# Patient Record
Sex: FEMALE | Race: WHITE | Employment: PART TIME | URBAN - NONMETROPOLITAN AREA
[De-identification: names, ages, dates, MRNs, and addresses within clinical notes are randomized per-mention and may not be internally consistent; named-entity substitution may affect disease eponyms.]

---

## 2017-03-06 DIAGNOSIS — F41.9 ANXIETY: ICD-10-CM

## 2017-03-06 RX ORDER — CLONAZEPAM 0.5 MG/1
TABLET ORAL
Qty: 90 TABLET | Refills: 5 | Status: ON HOLD | OUTPATIENT
Start: 2017-03-06 | End: 2017-04-28

## 2017-03-06 NOTE — TELEPHONE ENCOUNTER
Klonopin      Last Written Prescription Date: 10/10/16  Last Fill Quantity: 90,  # refills: 5   Last Office Visit with G, P or [facility] prescribing provider: 6/15/15

## 2017-04-25 ENCOUNTER — HOSPITAL ENCOUNTER (INPATIENT)
Facility: HOSPITAL | Age: 52
LOS: 2 days | Discharge: HOME | DRG: 897 | End: 2017-04-28
Attending: FAMILY MEDICINE | Admitting: PSYCHIATRY & NEUROLOGY
Payer: MEDICAID

## 2017-04-25 DIAGNOSIS — R45.851 SUICIDAL IDEATION: Primary | ICD-10-CM

## 2017-04-25 DIAGNOSIS — F33.1 MODERATE EPISODE OF RECURRENT MAJOR DEPRESSIVE DISORDER (H): ICD-10-CM

## 2017-04-25 DIAGNOSIS — F10.29 ALCOHOL DEPENDENCE WITH UNSPECIFIED ALCOHOL-INDUCED DISORDER (H): ICD-10-CM

## 2017-04-25 LAB
AMPHETAMINES UR QL SCN: NORMAL
ANION GAP SERPL CALCULATED.3IONS-SCNC: 12 MMOL/L (ref 3–14)
APAP SERPL-MCNC: NORMAL MG/L (ref 10–20)
BARBITURATES UR QL: NORMAL
BASOPHILS # BLD AUTO: 0 10E9/L (ref 0–0.2)
BASOPHILS NFR BLD AUTO: 0.4 %
BENZODIAZ UR QL: NORMAL
BUN SERPL-MCNC: 5 MG/DL (ref 7–30)
CALCIUM SERPL-MCNC: 8.5 MG/DL (ref 8.5–10.1)
CANNABINOIDS UR QL SCN: NORMAL
CHLORIDE SERPL-SCNC: 105 MMOL/L (ref 94–109)
CO2 SERPL-SCNC: 25 MMOL/L (ref 20–32)
COCAINE UR QL: NORMAL
CREAT SERPL-MCNC: 0.5 MG/DL (ref 0.52–1.04)
DIFFERENTIAL METHOD BLD: NORMAL
EOSINOPHIL # BLD AUTO: 0.1 10E9/L (ref 0–0.7)
EOSINOPHIL NFR BLD AUTO: 1 %
ERYTHROCYTE [DISTWIDTH] IN BLOOD BY AUTOMATED COUNT: 13.2 % (ref 10–15)
ETHANOL SERPL-MCNC: 0.29 G/DL
GFR SERPL CREATININE-BSD FRML MDRD: ABNORMAL ML/MIN/1.7M2
GLUCOSE SERPL-MCNC: 93 MG/DL (ref 70–99)
HCT VFR BLD AUTO: 45.6 % (ref 35–47)
HGB BLD-MCNC: 15.3 G/DL (ref 11.7–15.7)
IMM GRANULOCYTES # BLD: 0 10E9/L (ref 0–0.4)
IMM GRANULOCYTES NFR BLD: 0.4 %
INR PPP: 0.93 (ref 0.8–1.2)
LYMPHOCYTES # BLD AUTO: 2.3 10E9/L (ref 0.8–5.3)
LYMPHOCYTES NFR BLD AUTO: 45 %
MCH RBC QN AUTO: 30.1 PG (ref 26.5–33)
MCHC RBC AUTO-ENTMCNC: 33.6 G/DL (ref 31.5–36.5)
MCV RBC AUTO: 90 FL (ref 78–100)
METHADONE UR QL SCN: NORMAL
MONOCYTES # BLD AUTO: 0.2 10E9/L (ref 0–1.3)
MONOCYTES NFR BLD AUTO: 4.3 %
NEUTROPHILS # BLD AUTO: 2.5 10E9/L (ref 1.6–8.3)
NEUTROPHILS NFR BLD AUTO: 48.9 %
NRBC # BLD AUTO: 0 10*3/UL
NRBC BLD AUTO-RTO: 0 /100
OPIATES UR QL SCN: NORMAL
PCP UR QL SCN: NORMAL
PLATELET # BLD AUTO: 244 10E9/L (ref 150–450)
POTASSIUM SERPL-SCNC: 3.9 MMOL/L (ref 3.4–5.3)
RBC # BLD AUTO: 5.08 10E12/L (ref 3.8–5.2)
SALICYLATES SERPL-MCNC: 6 MG/DL
SODIUM SERPL-SCNC: 142 MMOL/L (ref 133–144)
WBC # BLD AUTO: 5.2 10E9/L (ref 4–11)

## 2017-04-25 PROCEDURE — 99285 EMERGENCY DEPT VISIT HI MDM: CPT | Performed by: FAMILY MEDICINE

## 2017-04-25 PROCEDURE — 80320 DRUG SCREEN QUANTALCOHOLS: CPT | Performed by: FAMILY MEDICINE

## 2017-04-25 PROCEDURE — 25000128 H RX IP 250 OP 636: Performed by: FAMILY MEDICINE

## 2017-04-25 PROCEDURE — 85610 PROTHROMBIN TIME: CPT | Performed by: FAMILY MEDICINE

## 2017-04-25 PROCEDURE — 80048 BASIC METABOLIC PNL TOTAL CA: CPT | Performed by: FAMILY MEDICINE

## 2017-04-25 PROCEDURE — 80307 DRUG TEST PRSMV CHEM ANLYZR: CPT | Performed by: FAMILY MEDICINE

## 2017-04-25 PROCEDURE — 36415 COLL VENOUS BLD VENIPUNCTURE: CPT | Performed by: FAMILY MEDICINE

## 2017-04-25 PROCEDURE — 80329 ANALGESICS NON-OPIOID 1 OR 2: CPT | Performed by: FAMILY MEDICINE

## 2017-04-25 PROCEDURE — 99285 EMERGENCY DEPT VISIT HI MDM: CPT

## 2017-04-25 PROCEDURE — 85025 COMPLETE CBC W/AUTO DIFF WBC: CPT | Performed by: FAMILY MEDICINE

## 2017-04-25 RX ORDER — LORAZEPAM 2 MG/ML
1-2 INJECTION INTRAMUSCULAR EVERY 10 MIN PRN
Status: DISCONTINUED | OUTPATIENT
Start: 2017-04-25 | End: 2017-04-26

## 2017-04-25 RX ORDER — LANOLIN ALCOHOL/MO/W.PET/CERES
100 CREAM (GRAM) TOPICAL ONCE
Status: DISCONTINUED | OUTPATIENT
Start: 2017-04-25 | End: 2017-04-26

## 2017-04-25 RX ADMIN — SODIUM CHLORIDE 1000 ML: 9 INJECTION, SOLUTION INTRAVENOUS at 21:29

## 2017-04-25 ASSESSMENT — ENCOUNTER SYMPTOMS
DYSURIA: 0
CHILLS: 0
COUGH: 0
DIARRHEA: 0
SHORTNESS OF BREATH: 0
VOMITING: 1
NAUSEA: 1
DIZZINESS: 0
BRUISES/BLEEDS EASILY: 0
FEVER: 0
ABDOMINAL PAIN: 0
SORE THROAT: 0
BACK PAIN: 0
NECK PAIN: 0

## 2017-04-25 NOTE — IP AVS SNAPSHOT
MRN:[id number]                      After Visit Summary   4/25/2017    Cherelle Tom    MRN: [id number]           Thank you!     Thank you for choosing [facility] for your care. Our goal is always to provide you with excellent care. Hearing back from our patients is one way we can continue to improve our services. Please take a few minutes to complete the written survey that you may receive in the mail after you visit with us. Thank you!        Patient Information     Date Of Birth          1965        Designated Caregiver       Most Recent Value    Caregiver    Will someone help with your care after discharge? yes    Name of designated caregiver isabella    Phone number of caregiver pt does not know    Caregiver address [place]      About your hospital stay     You were admitted on:  April 26, 2017 You last received care in the: [facility]    You were discharged on:  April 28, 2017       Who to Call     For medical emergencies, please call 911.  For non-urgent questions about your medical care, please call your primary care provider or clinic, [phone]          Attending Provider     Provider Specialty    Brandy Youssef MD Family Practice    Clem Mckeon MD Psychiatry    Capri Streeter NP Nurse Practitioner       Primary Care Provider Office Phone # Fax #    YueBHAVIN Bradford [phone]       [facility] [place] [address]  [place] [address]        Your next 10 appointments already scheduled     May 11, 2017  8:45 AM CDT   (Arrive by 8:30 AM)   Return Visit with Yesika Hamlin NP   [facility] [place] ([facility] [place] [facility])    [address]  [place] [id number] [phone]            Jun 02, 2017  7:30 AM CDT   (Arrive by 7:15 AM)   New Visit with Catalina Carranza, [facility] [place] [facility] ([facility] [place] [facility])    [address]  [place] [id number] [phone]              Further instructions  from your care team       Behavioral Discharge Planning and Instructions    Summary: Cherelle was admitted for suicidal ideation, alcohol intoxication and SIB - cut wrists     Main Diagnosis: Major depressive disorder, recurrent, moderate, Alcohol use disorder, moderate,  R/O PTSD    Major Treatments, Procedures and Findings: Stabilize with medications, connect with community programs.    Symptoms to Report: feeling more aggressive, increased confusion, losing more sleep, mood getting worse or thoughts of suicide    Lifestyle Adjustment: Take all medications as prescribed, meet with doctor/ medication provider, out patient therapist, , and [facility] worker as scheduled. Abstain from alcohol or any unprescribed drugs.    Psychiatry Follow-up:     [facility]   Med management - April - May 11th @ 8:45   [address] [place] [address]  [phone] Fax: [phone]      [facility]  [facility] - EMDR Therapy - June 2nd @ 7:30   [address] [place] [address]  [phone] Fax: [phone]    Medication Management Resources:    [facility]  [address] [address]  [phone]  Phx-[phone]    Therapy Resources:     [place] [address] [address]  [phone]  [phone]-[phone]    Mathew Adkins  Phone- [phone]  Fax- [phone]    [facility]  [address] [place] [address]  [phone]  Fax: [phone]    CD Resources:      [facility]- Rule 25  Contact- Sharlene Sheriff  [address] [place]  [address]  Phone: [phone]  Fax: [phone]  [email or web address]  [address] [place]  [address]  Phone - [phone]  Fax - [phone]    Resources:   Crisis Intervention: 281.766.1289 or 286-216-0738 (TTY: 468.282.8625).  Call anytime for help.  National [place] on Mental Illness (www.[place].kevin.org): 721.287.1902 or 250-991-7152.  Alcoholics Anonymous  (www.alcoholics-anonymous.org): Check your phone book for your local chapter.  Suicide Awareness Voices of Education (SAVE) (www.save.org): 378-513-HDDC (0050)  National Suicide Prevention Line (www.mentalhealthmn.org): 700-954-NKAG (3527)  Mental Health Consumer/Survivor Network of [place] (www.mhcsn.net): 118.305.9510 or 128-568-8734  Mental Health Association of [place] (www.mentalhealth.org): 896.526.3040 or 859-668-3548    General Medication Instructions:   See your medication sheet(s) for instructions.   Take all medicines as directed.  Make no changes unless your doctor suggests them.   Go to all your doctor visits.  Be sure to have all your required lab tests. This way, your medicines can be refilled on time.  Do not use any drugs not prescribed by your doctor.  Avoid alcohol.    [place] Area:  [facility], Crisis stabilization [facility]- 206.191.3678  [facility] Crisis Line: 1-897.300.4364  Advocates For Family Peace: 349-6702  Sexual Assault Program of [facility]: 819.136.5726 or 1-206.814.9518  [facility] Battered Women's Program: 1-569.774.2276 Ext: 279       Calls answered Mon-Fri-8:00 am--4:30 pm    [place]:  Advocates for Family Peace: 1-924.478.1098  [facility] first call for help: 9-927-363-8623  [facility] Crisis Center:  (159) 243-9036      [place] Area:  Warm Line: 1-684.865.1318       Calls answered Tuesday--Saturday 4:00 pm--10:00 pm  [facility] Crisis Line - 129.541.1023  [place] Crisis Stabilization 503-549-1355    [place] Statewide:  [place] Crisis and Referral Services: 1-705.471.9810  National Suicide Prevention Lifeline: 1-994-289-TALK (9329)   - dgk0xthq- Text  Life  to [address]  First Call for Help: 2-1-1  [facility] Helpline- 4-734-MPOW-HELP      Pending Results     No orders found from 4/23/2017 to 4/26/2017.            Statement of Approval     Ordered          04/28/17 1130  I have reviewed and agree with all the recommendations and orders detailed in this document.  EFFECTIVE NOW    "  Approved and electronically signed by:  Capri Streeter NP             Admission Information     Date & Time Provider Department Dept. Phone    2017 Capri Streeter NP [facility] [phone]      Your Vitals Were     Blood Pressure Pulse Temperature Respirations Height Weight    168/98 92 97.1  F (36.2  C) (Tympanic) 16 1.626 m (5' 4\") 63.6 kg (140 lb 3.2 oz)    Pulse Oximetry BMI (Body Mass Index)                96% 24.07 kg/m2          [employer] Information     [employer] lets you send messages to your doctor, view your test results, renew your prescriptions, schedule appointments and more. To sign up, go to [email or web address] . Click on \"Log in\" on the left side of the screen, which will take you to the Welcome page. Then click on \"Sign up Now\" on the right side of the page.     You will be asked to enter the access code listed below, as well as some personal information. Please follow the directions to create your username and password.     Your access code is: [email or web address]  Expires: 2017 11:58 AM     Your access code will  in 90 days. If you need help or a new code, please call your [facility] clinic or [phone].        Care EveryWhere ID     This is your Care EveryWhere ID. This could be used by other organizations to access your [facility] medical records  [id number]           Review of your medicines      START taking        Dose / Directions    PARoxetine 10 MG tablet   Commonly known as:  PAXIL   Used for:  Moderate episode of recurrent major depressive disorder (H)        Dose:  10 mg   Take 1 tablet (10 mg) by mouth At Bedtime   Quantity:  30 tablet   Refills:  0         STOP taking     clonazePAM 0.5 MG tablet   Commonly known as:  klonoPIN                Where to get your medicines      These medications were sent to [id number] - [place] - [address]  [address] [address]     Phone:  [phone]     PARoxetine 10 MG tablet                " Protect others around you: Learn how to safely use, store and throw away your medicines at www.disposemymeds.org.             Medication List: This is a list of all your medications and when to take them. Check marks below indicate your daily home schedule. Keep this list as a reference.      Medications           Morning Afternoon Evening Bedtime As Needed    PARoxetine 10 MG tablet   Commonly known as:  PAXIL   Take 1 tablet (10 mg) by mouth At Bedtime   Last time this was given:  10 mg on 4/27/2017  8:30 PM

## 2017-04-25 NOTE — IP AVS SNAPSHOT
[facility]    [address] [place] [address]    Phone:  [phone]    Fax:  [phone]                                       After Visit Summary   4/25/2017    Cherelle Tom    MRN: [id number]           After Visit Summary Signature Page     I have received my discharge instructions, and my questions have been answered. I have discussed any challenges I see with this plan with the nurse or doctor.    ..........................................................................................................................................  Patient/Patient Representative Signature      ..........................................................................................................................................  Patient Representative Print Name and Relationship to Patient    ..................................................               ................................................  Date                                            Time    ..........................................................................................................................................  Reviewed by Signature/Title    ...................................................              ..............................................  Date                                                            Time

## 2017-04-26 ENCOUNTER — TRANSFERRED RECORDS (OUTPATIENT)
Dept: HEALTH INFORMATION MANAGEMENT | Facility: HOSPITAL | Age: 52
End: 2017-04-26

## 2017-04-26 PROBLEM — R45.851 SUICIDAL IDEATION: Status: ACTIVE | Noted: 2017-04-26

## 2017-04-26 LAB — ETHANOL SERPL-MCNC: 0.13 G/DL

## 2017-04-26 PROCEDURE — HZ2ZZZZ DETOXIFICATION SERVICES FOR SUBSTANCE ABUSE TREATMENT: ICD-10-PCS | Performed by: NURSE PRACTITIONER

## 2017-04-26 PROCEDURE — 25000132 ZZH RX MED GY IP 250 OP 250 PS 637: Performed by: NURSE PRACTITIONER

## 2017-04-26 PROCEDURE — 36415 COLL VENOUS BLD VENIPUNCTURE: CPT | Performed by: FAMILY MEDICINE

## 2017-04-26 PROCEDURE — 12400000 ZZH R&B MH

## 2017-04-26 PROCEDURE — 80320 DRUG SCREEN QUANTALCOHOLS: CPT | Performed by: FAMILY MEDICINE

## 2017-04-26 PROCEDURE — 99223 1ST HOSP IP/OBS HIGH 75: CPT | Performed by: NURSE PRACTITIONER

## 2017-04-26 RX ORDER — ALUMINA, MAGNESIA, AND SIMETHICONE 2400; 2400; 240 MG/30ML; MG/30ML; MG/30ML
30 SUSPENSION ORAL EVERY 4 HOURS PRN
Status: DISCONTINUED | OUTPATIENT
Start: 2017-04-26 | End: 2017-04-28 | Stop reason: HOSPADM

## 2017-04-26 RX ORDER — OLANZAPINE 10 MG/2ML
10 INJECTION, POWDER, FOR SOLUTION INTRAMUSCULAR
Status: DISCONTINUED | OUTPATIENT
Start: 2017-04-26 | End: 2017-04-28 | Stop reason: HOSPADM

## 2017-04-26 RX ORDER — PAROXETINE 10 MG/1
10 TABLET, FILM COATED ORAL AT BEDTIME
Status: DISCONTINUED | OUTPATIENT
Start: 2017-04-26 | End: 2017-04-28 | Stop reason: HOSPADM

## 2017-04-26 RX ORDER — ACETAMINOPHEN 325 MG/1
650 TABLET ORAL EVERY 4 HOURS PRN
Status: DISCONTINUED | OUTPATIENT
Start: 2017-04-26 | End: 2017-04-28 | Stop reason: HOSPADM

## 2017-04-26 RX ORDER — HYDROXYZINE HYDROCHLORIDE 25 MG/1
25-50 TABLET, FILM COATED ORAL EVERY 4 HOURS PRN
Status: DISCONTINUED | OUTPATIENT
Start: 2017-04-26 | End: 2017-04-28 | Stop reason: HOSPADM

## 2017-04-26 RX ORDER — MULTIPLE VITAMINS W/ MINERALS TAB 9MG-400MCG
1 TAB ORAL DAILY
Status: DISCONTINUED | OUTPATIENT
Start: 2017-04-26 | End: 2017-04-28 | Stop reason: HOSPADM

## 2017-04-26 RX ORDER — LANOLIN ALCOHOL/MO/W.PET/CERES
100 CREAM (GRAM) TOPICAL DAILY
Status: DISCONTINUED | OUTPATIENT
Start: 2017-04-27 | End: 2017-04-28 | Stop reason: HOSPADM

## 2017-04-26 RX ORDER — OLANZAPINE 10 MG/1
10 TABLET ORAL
Status: DISCONTINUED | OUTPATIENT
Start: 2017-04-26 | End: 2017-04-28 | Stop reason: HOSPADM

## 2017-04-26 RX ORDER — LORAZEPAM 1 MG/1
1-4 TABLET ORAL EVERY 30 MIN PRN
Status: DISCONTINUED | OUTPATIENT
Start: 2017-04-26 | End: 2017-04-28 | Stop reason: HOSPADM

## 2017-04-26 RX ORDER — TRAZODONE HYDROCHLORIDE 50 MG/1
50 TABLET, FILM COATED ORAL
Status: DISCONTINUED | OUTPATIENT
Start: 2017-04-26 | End: 2017-04-28 | Stop reason: HOSPADM

## 2017-04-26 RX ADMIN — PAROXETINE HYDROCHLORIDE 10 MG: 10 TABLET, FILM COATED ORAL at 20:25

## 2017-04-26 RX ADMIN — LORAZEPAM 1 MG: 1 TABLET ORAL at 12:44

## 2017-04-26 RX ADMIN — HYDROXYZINE HYDROCHLORIDE 50 MG: 25 TABLET ORAL at 16:57

## 2017-04-26 RX ADMIN — MULTIPLE VITAMINS W/ MINERALS TAB 1 TABLET: TAB at 12:44

## 2017-04-26 RX ADMIN — HYDROXYZINE HYDROCHLORIDE 25 MG: 25 TABLET ORAL at 05:47

## 2017-04-26 ASSESSMENT — ACTIVITIES OF DAILY LIVING (ADL)
TOILETING: 0-->INDEPENDENT
COMMUNICATION: 0-->UNDERSTANDS/COMMUNICATES WITHOUT DIFFICULTY
FALL_HISTORY_WITHIN_LAST_SIX_MONTHS: NO
EATING: 0-->INDEPENDENT
AMBULATION: 0-->INDEPENDENT
RETIRED_EATING: 0-->INDEPENDENT
RETIRED_COMMUNICATION: 0-->UNDERSTANDS/COMMUNICATES WITHOUT DIFFICULTY
TRANSFERRING: 0-->INDEPENDENT
SWALLOWING: 0-->SWALLOWS FOODS/LIQUIDS WITHOUT DIFFICULTY
TRANSFERRING: 0-->INDEPENDENT
SWALLOWING: 0-->SWALLOWS FOODS/LIQUIDS WITHOUT DIFFICULTY
BATHING: 0-->INDEPENDENT
BATHING: 0-->INDEPENDENT
COGNITION: 0 - NO COGNITION ISSUES REPORTED
TOILETING: 0-->INDEPENDENT
DRESS: 0-->INDEPENDENT
DRESS: 0-->INDEPENDENT
AMBULATION: 0-->INDEPENDENT

## 2017-04-26 NOTE — ED NOTES
Pt states she is grieving over her husbands death 5 years ago and her Dads death on Easter. Pt cooperative, on a 1:1 safety monitor.

## 2017-04-26 NOTE — ED NOTES
"Pt arrived via [facility].  Pt has kerlex bandages to asha wrists.  Pt admits to drinking a lot of alcohol today and fell.  \"I am not staying\"  \"i just want to go to sleep and not wake up I've lost so much.  Pt is cooperative. Pt on a  hold.  "

## 2017-04-26 NOTE — ED NOTES
[facility] Daughter stated she will call pt's work and let them know that pt will not be at work in the am. Pt informed DEC assessment cannot be done until ETOH level at legall limit. Pt calm, watching TV. Pt continues to be on 1:1 supervision.

## 2017-04-26 NOTE — ED NOTES
Dr Youssef spoke with [facility] and we are going to forgo the DEC assessment and pt will be admitted.

## 2017-04-26 NOTE — ED PROVIDER NOTES
History     Chief Complaint   Patient presents with     Psychiatric Evaluation     Laceration     asha wrists     HPI  Cherelle Tom is a 52 year old female who has a history of Depression, anxiety, alcohol abuse,  Presents after her daughter called she drank too much today and she just wanted to lay down and no  She has abrasions on both her wrists, but states she doesn't know how it happened.     She has been vomiting 5 x per day since her dad  on . She last vomited yesterday. No hematemesis.     Chemical dependency: She drinks 6 tap beers per night.  She went to treatment, once in her 20s and her longest sobriety was 9 months when she was in retirement after having 4 DUIs.     Habits: tobacco 4 cigs per day. ETOH 6 beer daily    Family History: mom never drank. Dad drank but had been sober 40 years when he .  was alcoholic as well but sober 20 years when .     Social History: 15: Cherelle lives alone. Her   in . Her birthday is  and her dad  . She has 4 children. She had her dad's  on  and her daughter's shower on . She works at [employer]. The longest she's been sober is 9 months and she was in retirement for 4 DUIs at that time. She has no grandchildren.    I have reviewed the Medications, Allergies, Past Medical and Surgical History, and Social History in the Epic system.    Review of Systems   Constitutional: Negative for chills and fever.   HENT: Negative for ear pain and sore throat.    Respiratory: Negative for cough and shortness of breath.    Cardiovascular: Negative for chest pain.   Gastrointestinal: Positive for nausea and vomiting. Negative for abdominal pain and diarrhea.   Genitourinary: Negative for dysuria.   Musculoskeletal: Negative for back pain and neck pain.   Skin: Negative for rash.   Neurological: Negative for dizziness.   Hematological: Does not bruise/bleed easily.   Psychiatric/Behavioral:        +  depression & anxiety   All other systems reviewed and are negative.      Physical Exam   BP: (!) 171/116  Pulse: 79  Temp: 98  F (36.7  C)  Resp: 18  SpO2: 96 %  Physical Exam   Constitutional: She is oriented to person, place, and time. She appears well-developed. No distress.   HENT:   Head: Normocephalic and atraumatic.   Eyes: Pupils are equal, round, and reactive to light.   Neck: Neck supple.   Cardiovascular: Normal rate, regular rhythm and normal heart sounds.  Exam reveals no gallop and no friction rub.    No murmur heard.  Pulmonary/Chest: Effort normal and breath sounds normal. No respiratory distress.   Abdominal: There is no tenderness. There is no rebound and no guarding.   Musculoskeletal: She exhibits no edema.   Lymphadenopathy:     She has no cervical adenopathy.   Neurological: She is alert and oriented to person, place, and time.   Skin: Skin is warm and dry.   Psychiatric: She has a normal mood and affect.   Nursing note and vitals reviewed.      ED Course     ED Course     Procedures         [unfilled]    Patient Vitals for the past 24 hrs:   BP Temp Temp src Pulse Resp SpO2   04/25/17 1942 (!) 171/116 98  F (36.7  C) Oral 79 18 96 %       LABORATORY (REVIEWED AND INTERPRETED):  CBC BMP Liver Panel   Recent Labs   Lab Test  04/25/17 2015   WBC  5.2   HGB  15.3   PLT  244    Recent Labs   Lab Test  04/25/17 2015   POTASSIUM  3.9   CHLORIDE  105   BUN  5*    Recent Labs   Lab Test  08/26/15   1501   BILITOTAL  0.5   ALT  33   AST  23   LIPASE  109        UA     DIP MICRO   No lab results found.    Invalid input(s): SPECGAV, GLUCONSUA, KETONESUA, BLOODUA, LEUKOCYTE Invalid input(s): RBCUA, WBCUA, BACTERIAUA, EPITHELIALUA       OTHER LABS   Recent Labs   Lab Test  08/26/15   1501  07/16/13 2005   TSH   --   1.98   LIPASE  109   --             INTERVENTIONS:  Medications   0.9% sodium chloride BOLUS (not administered)   LORazepam (ATIVAN) injection 1-2 mg (not administered)   thiamine tablet 100  mg (not administered)       ECG (Reviewed and Interpreted by me):  None    IMAGING (Reviewed and Interpreted by me):  None    ED COURSE:  8:50 PM The patient has been seen and evaluated by me.  I have reviewed the medical records.     She understands that she will be sleeping here until her etoh < 0.08. She weill then have a DEC assessment. She knows she will miss work in the am.     3:07 AM Liane Assessment and Referral.     IMPRESSIONS AND PLAN:  Alcohol intoxication with suicidal ideation: Cherelle has been drinking daily since her 20s.  She drinks 6 beer  Her longest sobriety was 9 months  She states that she drank too much tonight and has abrasions on both wrists but doesn't know how they occurred. No sutures needed. Her dad  on --the day before her birthday and  was 4 days ago. She is not managing life well at this time. She would benefit from admission to a mental health unit.     At present time, there is no evidence of a non behavioral medical emergency that would preclude Cherelle from being admitted to a mental health unit for further psychiatric as well as medical evaluation.        DIAGNOSIS:    ICD-10-CM    1. Suicidal ideation R45.851    2. Alcohol dependence with unspecified alcohol-induced disorder (H) F10.29        DISCHARGE MEDICATIONS:  New Prescriptions    No medications on file         LOS:  4      2017  [facility]    Yue Foote Cassandra E, MD  17 [id number]       Brandy Youssef MD  17 [id number]

## 2017-04-26 NOTE — PLAN OF CARE
Face to face end of shift report received from Vahid RN. Rounding completed. Patient observed in bed.     Alison Ayala  4/26/2017  7:43 AM

## 2017-04-26 NOTE — ED NOTES
"Pt tearful, crying, cooperative with changing into hospital scrubs. Pt keep saying over and over \"I am not staying\". Daughter her with pt. Daughter stated pt had called her and stated she wanted to die. Daughter called police for a health check. Pt keeps saying over and over again \"I want to go to sleep and never wake up\".  "

## 2017-04-26 NOTE — PLAN OF CARE
Face to face end of shift report received from SOFIE Rosas. Rounding completed. Patient observed in group.      Brigida Paez  4/26/2017  3:35 PM

## 2017-04-26 NOTE — ED NOTES
"Pt keeps saying she fell and that is how she cut her wrists \"but you know I am lying about the fall\".  "

## 2017-04-26 NOTE — H&P
"Psychiatric Eval/H&P  Patient Name: Cherelle Tom   YOB: 1965  Age: 52 year old  [id number]    Primary Physician: Yue Foote   Completed By: Capri Streeter NP     CC: \"I must have drank to much last night\"    HPI   Cherelle Tom is a 52 year old single  female who presented to the [facility] ER via ambulance after her daughter called police to do a welfare check. Apparently Cherelle had been drinking yesterday and called her daughter and stated that she drank too much and just wanted to lay down and not wake up. Her daughter states that she kept repeating that she wanted to die, so became very concerned. Apparently she has been going through some significant stressors that have recently exacerbated her depression and anxiety. The anniversary of her 's death 6 years ago is coming up. Her father passed away on , which was [facility], and her birthday was the following day. Her father's  was on . She denies that she had any plan, though does have a history of suicide attempt in the past. She reports that ever since her  passed away she has been struggling with depression. She reports that she has a hard time falling asleep and staying asleep at night. She has little motivation to do things and struggles to function at home. She reports a decrease in appetite and has experienced nausea and vomiting since her father passed away, which she relates to stress.   Cherelle minimizes her alcohol use when I speak with her today. She states that she has no recollection of making a phone call to her daughter yesterday or making any type of suicidal statement. She does admit that she was drinking heavily prior to admission and admits to daily alcohol use. She is not interested in chemical dependency treatment. She feels that she will be able to stop her alcohol use on her own. She denies that she is suicidal at this time, though does report ongoing depression and " anxiety. She is hesitant to discuss medications as she currently does not have health insurance and does not want to discharge on something that she cannot afford. She is amenable to starting an antidepressant that is on the four dollar list at [facility], as she states that knows she will be able to afford that until she has health insurance again.         SPECIFIC SYMPTOM HISTORY  Sleep:trouble staying asleep and trouble falling asleep .  Recent appetite change: Yes: recent nausea and vomiting.  Recent weight change: No.  Special diet: No.  Other nutritional concerns: none.  Psychotic symptoms (subjective): No hallucinations.denies symptoms of psychosis     [facility]     Past Psychiatric History:   This is her third inpatient stay on a mental health unit, the other 2 occurring in  and most recently in  when she overdosed on alcohol and klonopin. She reports a history of depression and anxiety. She does not see any outpatient mental health providers. She was set up with a therapist after her last discharge in , though states that she only went once. She reports having been on antidepressants for 20+ years of her life, though is not taking any meds at this time. She is only able to recall trials of Effexor, Celexa, and Trazodone.     Social History:   She currently lives alone after her  passed away in . They had been  for about 27 years. She states that her  had gotten up in the middle of the night and tripped over their dog. He fell and hit his head on a coffee table and . She reportedly was the one to find him and was unable to do CPR and harbors some guilt about this. She currently works at [employer] full-time. She grew up with both parents and reports 10 siblings, 8 sisters and 2 brothers. She graduated from high school. She has 4 children, 3 daughters and a son.        Chemical Use History:   There is a significant history of alcohol use. She reports having been in  chemical dependency treatment twice in the past, the last time about 5 years ago. She reports drinking about 6 beers a night and has been drinking every day. Records indicate that her longest period of sobriety was 9 months when she was in senior living for a DUI. She has had 4 DUIs in the past. She denies any current or historical use of illicit substances.       Family Psychiatric History:   Denies any history of mental illness in the family. States that her father had a history of alcohol abuse but was sober 40 years prior to his death. Her  also had an alcohol abuse history, though was sober about 20 years before his death.        Medical History and ROS  No current outpatient prescriptions on file.     No Known Allergies  Past Medical History:   Diagnosis Date     Alcohol abuse      Anxiety  3/21/2011     Depression 2012     Unspecified essential hypertension 2004     Past Surgical History:   Procedure Laterality Date       1991    hysterectomy       1991     [facility]STERECTOMY, [facility]           Physical Exam    Constitutional: oriented to person, place, and time.  appears well-developed and well-nourished.   HENT:   Head: Normocephalic and atraumatic.   Right Ear: External ear normal.   Left Ear: External ear normal.   Nose: Nose normal.   Mouth/Throat: Oropharynx is clear and moist. No oropharyngeal exudate.   Eyes: Conjunctivae and EOM are normal. Pupils are equal, round, and reactive to light. Right eye exhibits no discharge. Left eye exhibits no discharge. No scleral icterus.   Neck: Normal range of motion. Neck supple. No JVD present. No tracheal deviation present. No thyromegaly present.   Cardiovascular: Normal rate, regular rhythm, normal heart sounds and intact distal pulses. Exam reveals no gallop and no friction rub.   No murmur heard.  Pulmonary/Chest: Effort normal and breath sounds normal. No stridor. No respiratory distress.  no wheezes. no rales. no tenderness.  "  Abdominal: Soft. Bowel sounds are normal.  no distension and no mass. There is no tenderness. There is no rebound and no guarding.  Skin: Visible skin intact    Review of Systems:  Constitution: No weight loss, fever, night sweats  Skin: No rashes, pruritus or open wounds  Neuro: No headaches or seizure activity.  Psych:  See HPI  Eyes: No vision changes.  ENT: No problems chewing or swallowing.   Musculoskeletal: No muscle pain, joint pain or swelling   Respiratory: No cough or dyspnea  Cardiovascular:  No chest pain,  palpitations or fainting. Has HTN  Gastrointestinal:  Reports recent nausea and vomiting due to stress         MSE/PSYCH  PSYCHIATRIC EXAM  /90  Pulse 72  Temp 97.8  F (36.6  C) (Tympanic)  Resp 12  Ht 1.626 m (5' 4\")  Wt 63.6 kg (140 lb 3.2 oz)  SpO2 98%  BMI 24.07 kg/m2  -Appearance/Behavior:  No apparent distress and Casually groomed    -Attitude:pleasant  -Motor: normal or unremarkable.  -Gait: Normal.    -Abnormal involuntary movements: none.  -Mood: depressed and anxious.  -Affect: Restricted.  -Speech: Normal.                 -Thought process/associations: Logical, Linear and Goal directed.  -Thought content: normal, no delusions.  -Perceptual disturbances: No hallucinations..              -Suicidal/Homicidal Ideation: none  -Judgment: Limited.  -Insight: Adequate.  *Orientation: time, place and person.  *Memory: intact  *Attention: Adequate for interview  *Language: fluent, no aphasias, able to repeat phrases and name objects. Vocab intact.  *Fund of information: appropriate for education  *Cognitive functioning estimate: 0 - independent.     Labs:   Results for orders placed or performed during the hospital encounter of 04/25/17 (from the past 48 hour(s))   Drug Screen Urine (Range)   Result Value Ref Range    Amphetamine Qual Urine  NEG     Negative   Cutoff for a negative amphetamine is 500 ng/mL or less.      Barbiturates Qual Urine  NEG     Negative   Cutoff for a negative " barbiturate is 200 ng/mL or less.      Benzodiazepine Qual Urine  NEG     Negative   Cutoff for a negative benzodiazepine is 200 ng/mL or less.      Cannabinoids Qual Urine  NEG     Negative   Cutoff for a negative cannabinoid is 50 ng/mL or less.      Cocaine Qual Urine  NEG     Negative   Cutoff for a negative cocaine is 300 ng/mL or less.      Opiates Qualitative Urine  NEG     Negative   Cutoff for a negative opiate is 300 ng/mL or less.      Methadone Qual Urine  NEG     Negative   Cutoff for a negative methadone is 300 ng/mL or less.      PCP Qual Urine  NEG     Negative   Cutoff for a negative PCP is 25 ng/mL or less.     Acetaminophen level   Result Value Ref Range    Acetaminophen Level <2  Therapeutic range: 10-20 mg/L   mg/L   Alcohol ethyl   Result Value Ref Range    Ethanol g/dL 0.29 (H) 0.01 g/dL   Basic metabolic panel   Result Value Ref Range    Sodium 142 133 - 144 mmol/L    Potassium 3.9 3.4 - 5.3 mmol/L    Chloride 105 94 - 109 mmol/L    Carbon Dioxide 25 20 - 32 mmol/L    Anion Gap 12 3 - 14 mmol/L    Glucose 93 70 - 99 mg/dL    Urea Nitrogen 5 (L) 7 - 30 mg/dL    Creatinine 0.50 (L) 0.52 - 1.04 mg/dL    GFR Estimate >90  Non  GFR Calc   >60 mL/min/1.7m2    GFR Estimate If Black >90   GFR Calc   >60 mL/min/1.7m2    Calcium 8.5 8.5 - 10.1 mg/dL   CBC with platelets differential   Result Value Ref Range    WBC 5.2 4.0 - 11.0 10e9/L    RBC Count 5.08 3.8 - 5.2 10e12/L    Hemoglobin 15.3 11.7 - 15.7 g/dL    Hematocrit 45.6 35.0 - 47.0 %    MCV 90 78 - 100 fl    MCH 30.1 26.5 - 33.0 pg    MCHC 33.6 31.5 - 36.5 g/dL    RDW 13.2 10.0 - 15.0 %    Platelet Count 244 150 - 450 10e9/L    Diff Method Automated Method     % Neutrophils 48.9 %    % Lymphocytes 45.0 %    % Monocytes 4.3 %    % Eosinophils 1.0 %    % Basophils 0.4 %    % Immature Granulocytes 0.4 %    Nucleated RBCs 0 0 /100    Absolute Neutrophil 2.5 1.6 - 8.3 10e9/L    Absolute Lymphocytes 2.3 0.8 - 5.3 10e9/L     Absolute Monocytes 0.2 0.0 - 1.3 10e9/L    Absolute Eosinophils 0.1 0.0 - 0.7 10e9/L    Absolute Basophils 0.0 0.0 - 0.2 10e9/L    Abs Immature Granulocytes 0.0 0 - 0.4 10e9/L    Absolute Nucleated RBC 0.0    Salicylate level   Result Value Ref Range    Salicylate Level 6 mg/dL   INR   Result Value Ref Range    INR 0.93 0.80 - 1.20   Alcohol ethyl   Result Value Ref Range    Ethanol g/dL 0.13 (H) 0.01 g/dL        Assessment/Impression: This is a 52 year old yo female with a history of depression, anxiety, and alcohol abuse. She had reportedly called her daughter last night while intoxicated and made vague suicidal statements. She has many recent stressors that have exacerbated her symptoms. She is currently denying suicidal ideation, though seems high risk due to a history of attempts, recent death of father and anniversary of 's death, limited social support and ongoing alcohol abuse. She minimizes her alcohol use and has little insight, downplaying the events that lead to her hospitalization and her past history of attempts. She shows no interest in CD treatment or outpatient therapy and feels that she will be able to fix this on her own. She currently does not have health insurance, though will meet with a financial aid while in the hospital. She is agreeable to starting an affordable antidepressant today and is willing to start Paxil as she does not remember having trialed this and it would cost less than $10 a month.       Educated regarding medication indications, risks, benefits, side effects, contraindications and possible interactions. Verbally expressed understanding.     DX: Major depressive disorder, recurrent, moderate  Alcohol use disorder, moderate   R/O PTSD    Plan:  Admit to Unit: 5 [facility]  Attending: Capri Streeter CNP  Patient is on a 72 hour mental health hold  Other routine labs were notable for UDS-neg, initial CHRIS-290  Monitor for target symptoms: decreased depression, no  SIB  Provide a safe environment and therapeutic milieu.   Stop Clonazepam due to continued alcohol use  Will place on MSSA protocol and monitor for alcohol withdrawal  Start Paxil 10 mg daily due to affordability  Speak with financial services regarding health insurance coverage    Anticipated length of stay: 3-5 days       ART Haile, CNP

## 2017-04-26 NOTE — PLAN OF CARE
"Social Service Psychosocial Assessment  Presenting Problem:   Pt was brought to the ED after her daughter called the the PD because pt was making suicidal statements and had cuts on her wrist. Pt states she feels like an \"idiot\" today- she remembers cutting her wrists and calling her daughter.   Marital Status:  -  passed away in   Spouse / Children:    4 children- son- 31 lives in [place], daughter-27 lives in [place], daughter- 25 lives in [place], daughter- 22 lives in [place]- Says she has a close relationship with her children   Psychiatric TX HX:   Pt states she has been to this unit in the past- about 6 years ago after she attempted suicide when her     Suicide Risk Assessment: Pt was admitted after making suicidal statements and had cut her wrists while intoxicated. Pt attempted suicide in the past by overdosing after he   about 6 years ago. Denies SI today- admits to depression- says its at an 8.  Access to Lethal Means (explain):   No access to guns   Family Psych HX:   Pt's father was an alcoholic- had been sober 40 years before he .  was also an alcoholic- had been sober 20 years when he    A & Ox:   x3  Medication Adherence:   Unknown  Medical Issues:   See H&P  Visual  Motor Functioning:   Good   Communication Skills /Needs:   Good   Ethnicity:   White     Spirituality/Evangelical Affiliation:   Unknown  Clergy Request:   No   History:   None reported   Living Situation:   Lives alone in [place]- owns a trailer- Plans on moving to [place]  ADL s:  x3  Education:  Graduated HS- no college  Financial Situation:   Okay  Occupation:  Works at full time at [email or web address]   Leisure & Recreation:  She enjoys walking, reading, spending time with kids, going to the cabin and baseball games.  Childhood History:   Grew up in [place] with both parents, 8 sisters, 2 brothers- she is the 3rd to the youngest. Says when she was 13 her father left the house and " moved to their cabin. Says about 15 years later he moved back in with her mom- they stayed  until they . States she had a good childhood.  Trauma Abuse HX:   None reported   Relationship / Sexuality:  Had been  27 years-  past away in - Says she will always be in a committed relationship with her    Substance Use/ Abuse:   ED note states she drinks 6 tab beers per night. Longest period of sobriety was 9 months- when she was in [facility] after having 4 DUIs. Denies any other substance use  Chemical Dependency Treatment HX: States she has been in outpt treatment many years ago in [place]- Is not interested in going to treatment but says she knows she is an alcoholic    Legal Issues: Nothing current.  History of 4 DWIs   Significant Life Events:   Father passed away on - -  was 5 days ago. Mother passed away 2 years ago.  passed away 6 years ago.   Strengths:   Family support, Accepting of services   Challenges /Limitation:   Grief and loss, lack of positive coping skills, substance use   Patient Support Contact (Include name, relationship, number, and summary of conversation):   Pt has no release signed at this time.   Interventions:        Community-Based Programs- Grief and loss support groups    Medical/Dental Care- Yue Foote CD Evaluation/Rule 25/Aftercare- Recommended- Pt not interested- states she knows she is an alcoholic but will work on it on her own    Medication Management- Resources- Possible referral     Individual Therapy- Pt not interested- will give resources     Insurance Coverage- Will meet with pt financial     Suicide Risk Assessment- Pt was admitted after making suicidal statements and had cut her wrists while intoxicated. Pt attempted suicide in the past by overdosing after he   about 6 years ago. Denies SI today- admits to depression- says its at an 8.    High Risk Safety Plan- Talk to supports; Call crisis  lines; Go to local ER if feeling suicidal.

## 2017-04-26 NOTE — PLAN OF CARE
Problem: Goal Outcome Summary  Goal: Goal Outcome Summary  Outcome: Therapy, progress toward functional goals is gradual  Pt denies SI HI hallucinations and pain at this time.  She admits to daily anxiety which she is prescribed Clonazepam for.  She also admits to grief.  Anniversary of  death soon and dad passed away last week.  Pt complains of anxiety and not able to give prn it is to soon.  Pt also concerned because she has no insurance.  Informed her financial will com up and meet with her.  She signed ABDOUL for daughter. Medications verified with patient and her pharmacy.  She verbalized being off BP for over a year as she can't afford it.  She signed care everywhere. She is participating in unit programming. Pleasant and cooperative.     Problem: Thought Process Alteration (Adult)  Intervention: Optimize Communication  Pt able to make needs known, and is able to understand spoken language.     Goal: Improved Thought Process  Contract for safety  No harm to self or others while on unit  Will attend 75% of groups  Will agree to tx team recommendations for meds and follow up care  Will deny suicidal ideation on discharge  Reduction in depression by discharge date  Will tell staff if unable to keep self safe  Safe withdrawal from alcohol   Outcome: Therapy, progress towards functional goals is fair  Pt contracts to not harm self or others, she is attending unit programming, no thoughts of SI at this time. Verbalizes grief.

## 2017-04-26 NOTE — ED NOTES
"Per Dr Domonique LOZADA pt was on a voluntary hold, but when I told her she would be going upstairs she said, \"No I'm not staying.  I advised her we would be placing her on a 72 hr hold.  Pt was read her rights and given a copy.  Pt cooperative and ambulatory with staff to [facility].  "

## 2017-04-26 NOTE — PROGRESS NOTES
04/26/17 0533   Patient Belongings   Did you bring any home meds/supplements to the hospital?  Yes   Disposition of meds  Sent to security/pharmacy per site process   Patient Belongings clothing   Disposition of Belongings black sweater, jeans, chapstick, bra, panties, socks, white vest, grey lighter, green lighter, 2 single cigaretts, white tennis shoes, bandaids, black purse, brown belt, 1 unopened pack of marlbaro cigaretts, 2 pack ice breakers, black glasses, yellow golf ball, lip gloss, wine stopper, matches, 13 pictures, 11 cards, paper, reciept, $17.47 in change.   Belongings Search Yes   Clothing Search Yes   Second Staff Don   General Info Comment n/a    List items sent to safe:jewelry box, silver neclace, cross neclace, 2 drivers liscence, 4 debit cards, 1 mastercard, 1 check, 2 social security cards, 3 chips, 2 medical cards, 2 keys and keychain, 1 single key, 1 pin, $359. All other belongings put in assigned cubby in belongings room.     I have reviewed my belongings list on admission and verify that it is correct.     Patient signature_______________________________    Second staff witness (if patient unable to sign) ______________________________       I have received all my belongings at discharge.    Patient signature________________________________    Danae4/26/2017  5:37 AM

## 2017-04-27 PROCEDURE — 12400000 ZZH R&B MH

## 2017-04-27 PROCEDURE — 25000132 ZZH RX MED GY IP 250 OP 250 PS 637: Performed by: NURSE PRACTITIONER

## 2017-04-27 PROCEDURE — 99232 SBSQ HOSP IP/OBS MODERATE 35: CPT | Performed by: NURSE PRACTITIONER

## 2017-04-27 RX ADMIN — Medication 100 MG: at 08:56

## 2017-04-27 RX ADMIN — PAROXETINE HYDROCHLORIDE 10 MG: 10 TABLET, FILM COATED ORAL at 20:30

## 2017-04-27 RX ADMIN — LORAZEPAM 1 MG: 1 TABLET ORAL at 08:56

## 2017-04-27 RX ADMIN — MULTIPLE VITAMINS W/ MINERALS TAB 1 TABLET: TAB at 08:55

## 2017-04-27 NOTE — PROGRESS NOTES
"[facility]  Psychiatric Progress Note      Impression:   This is a 52 year old yo female with a history of depression, anxiety, and alcohol abuse.    Cherelle is sitting in the lounge when I speak with her. She states that she is feeling \"better\" today and is less depressed and anxious. She denies any suicidal thoughts and states that she \"made a mistake\" while intoxicated and feels that this is a \"wake up call\" regarding her ongoing alcohol use. She tells me that she slept well last night and seemed surprised by this, stating that she typically sleeps poorly. She has been attending groups. She did meet with financial aid to apply for MA, so is hopeful about this. She states that it has been almost a year since she has seen any provider or taken medication due to lack of insurance. She is agreeable to getting set up with med management and therapy on an outpatient basis. She reports having a lot of support from her children and feels safe at home.      Educated regarding medication indications, risks, benefits, side effects, contraindications and possible interactions. Verbally expressed understanding.        DIagnoses:   Active Problems:   Major depressive disorder, recurrent, moderate  Alcohol use disorder, moderate   R/O PTSD      Attestation:  Patient has been seen and evaluated by me,  Capri Streeter NP          Interim History:   The patient's care was discussed with the treatment team and chart notes were reviewed.          Medications:     Current Facility-Administered Medications Ordered in Epic   Medication Dose Route Frequency Last Rate Last Dose     hydrOXYzine (ATARAX) tablet 25-50 mg  25-50 mg Oral Q4H PRN   50 mg at 04/26/17 1657     acetaminophen (TYLENOL) tablet 650 mg  650 mg Oral Q4H PRN         alum & mag hydroxide-simethicone (MYLANTA ES/MAALOX  ES) suspension 30 mL  30 mL Oral Q4H PRN         magnesium hydroxide (MILK OF MAGNESIA) suspension 30 mL  30 mL Oral At Bedtime PRN         " "traZODone (DESYREL) tablet 50 mg  50 mg Oral At Bedtime PRN         OLANZapine (zyPREXA) tablet 10 mg  10 mg Oral Q2H PRN        Or     OLANZapine (zyPREXA) injection 10 mg  10 mg Intramuscular Q2H PRN         nicotine polacrilex (NICORETTE) gum 2-4 mg  2-4 mg Buccal Q1H PRN         multivitamin, therapeutic with minerals (THERA-VIT-M) tablet 1 tablet  1 tablet Oral Daily   1 tablet at 04/27/17 0855     LORazepam (ATIVAN) tablet 1-4 mg  1-4 mg Oral Q30 Min PRN   1 mg at 04/27/17 0856     PARoxetine (PAXIL) tablet 10 mg  10 mg Oral At Bedtime   10 mg at 04/26/17 2025     thiamine tablet 100 mg  100 mg Oral Daily   100 mg at 04/27/17 0856     No current Epic-ordered outpatient prescriptions on file.          10 point ROS reviewed and negative other than per HPI       Allergies:   No Known Allergies         Psychiatric Examination:   /93  Pulse 98  Temp 98.4  F (36.9  C) (Tympanic)  Resp 16  Ht 1.626 m (5' 4\")  Wt 63.6 kg (140 lb 3.2 oz)  SpO2 98%  BMI 24.07 kg/m2  Weight is 140 lbs 3.2 oz  Body mass index is 24.07 kg/(m^2).    Appearance:  awake, alert, adequately groomed and dressed in hospital scrubs  Attitude:  cooperative  Eye Contact:  good  Mood:  better  Affect:  appropriate and in normal range  Speech:  clear, coherent  Psychomotor Behavior:  no evidence of tardive dyskinesia, dystonia, or tics  Thought Process:  logical, linear and goal oriented  Associations:  no loose associations  Thought Content:  no evidence of suicidal ideation or homicidal ideation and no evidence of psychotic thought  Insight:  fair  Judgment:  fair  Oriented to:  time, person, and place  Attention Span and Concentration:  intact  Recent and Remote Memory:  intact  Fund of Knowledge: appropriate  Muscle Strength and Tone: normal  Gait and Station: Normal           Labs:     Results for orders placed or performed during the hospital encounter of 04/25/17 (from the past 48 hour(s))   Drug Screen Urine (Range)   Result Value " Ref Range    Amphetamine Qual Urine  NEG     Negative   Cutoff for a negative amphetamine is 500 ng/mL or less.      Barbiturates Qual Urine  NEG     Negative   Cutoff for a negative barbiturate is 200 ng/mL or less.      Benzodiazepine Qual Urine  NEG     Negative   Cutoff for a negative benzodiazepine is 200 ng/mL or less.      Cannabinoids Qual Urine  NEG     Negative   Cutoff for a negative cannabinoid is 50 ng/mL or less.      Cocaine Qual Urine  NEG     Negative   Cutoff for a negative cocaine is 300 ng/mL or less.      Opiates Qualitative Urine  NEG     Negative   Cutoff for a negative opiate is 300 ng/mL or less.      Methadone Qual Urine  NEG     Negative   Cutoff for a negative methadone is 300 ng/mL or less.      PCP Qual Urine  NEG     Negative   Cutoff for a negative PCP is 25 ng/mL or less.     Acetaminophen level   Result Value Ref Range    Acetaminophen Level <2  Therapeutic range: 10-20 mg/L   mg/L   Alcohol ethyl   Result Value Ref Range    Ethanol g/dL 0.29 (H) 0.01 g/dL   Basic metabolic panel   Result Value Ref Range    Sodium 142 133 - 144 mmol/L    Potassium 3.9 3.4 - 5.3 mmol/L    Chloride 105 94 - 109 mmol/L    Carbon Dioxide 25 20 - 32 mmol/L    Anion Gap 12 3 - 14 mmol/L    Glucose 93 70 - 99 mg/dL    Urea Nitrogen 5 (L) 7 - 30 mg/dL    Creatinine 0.50 (L) 0.52 - 1.04 mg/dL    GFR Estimate >90  Non  GFR Calc   >60 mL/min/1.7m2    GFR Estimate If Black >90   GFR Calc   >60 mL/min/1.7m2    Calcium 8.5 8.5 - 10.1 mg/dL   CBC with platelets differential   Result Value Ref Range    WBC 5.2 4.0 - 11.0 10e9/L    RBC Count 5.08 3.8 - 5.2 10e12/L    Hemoglobin 15.3 11.7 - 15.7 g/dL    Hematocrit 45.6 35.0 - 47.0 %    MCV 90 78 - 100 fl    MCH 30.1 26.5 - 33.0 pg    MCHC 33.6 31.5 - 36.5 g/dL    RDW 13.2 10.0 - 15.0 %    Platelet Count 244 150 - 450 10e9/L    Diff Method Automated Method     % Neutrophils 48.9 %    % Lymphocytes 45.0 %    % Monocytes 4.3 %    %  Eosinophils 1.0 %    % Basophils 0.4 %    % Immature Granulocytes 0.4 %    Nucleated RBCs 0 0 /100    Absolute Neutrophil 2.5 1.6 - 8.3 10e9/L    Absolute Lymphocytes 2.3 0.8 - 5.3 10e9/L    Absolute Monocytes 0.2 0.0 - 1.3 10e9/L    Absolute Eosinophils 0.1 0.0 - 0.7 10e9/L    Absolute Basophils 0.0 0.0 - 0.2 10e9/L    Abs Immature Granulocytes 0.0 0 - 0.4 10e9/L    Absolute Nucleated RBC 0.0    Salicylate level   Result Value Ref Range    Salicylate Level 6 mg/dL   INR   Result Value Ref Range    INR 0.93 0.80 - 1.20   Alcohol ethyl   Result Value Ref Range    Ethanol g/dL 0.13 (H) 0.01 g/dL              Plan:   Continue current medications  Discussed possibly starting Naltrexone tomorrow  Possible discharge tomorrow if stable

## 2017-04-27 NOTE — PLAN OF CARE
Problem: Goal Outcome Summary  Goal: Goal Outcome Summary  Outcome: Therapy, progress toward functional goals as expected  Pt denies SI HI Hallucinations she admits to some depression and anxiety no complaints of pain.  She is on mssa and scored an 8 1 mg ativan administered.  Pt complains of poor sleep. She states she gets restless legs and it keeps her awake. She is compliant with medications and is encouraged to go to groups.      1215: MSSA score of 4 no PRN coverage.     Problem: Thought Process Alteration (Adult)  Intervention: Optimize Communication  Pt is able to communicate needs.     Goal: Improved Thought Process  Contract for safety  No harm to self or others while on unit  Will attend 75% of groups  Will agree to tx team recommendations for meds and follow up care  Will deny suicidal ideation on discharge  Reduction in depression by discharge date  Will tell staff if unable to keep self safe  Safe withdrawal from alcohol   Pt states  i wish I didn't have to stay here, I just drank to much.  i have been having a lot of stress and drank to much.  I don't want to die.     Problem: Depression (Adult,Obstetrics,Pediatric)  Goal: Establish/Maintain Self-Care Routine  Patient will demonstrate the desired outcomes by discharge/transition of care.   Pt does admit to depression as her dad past away and the anniversary of  is this time.

## 2017-04-27 NOTE — PLAN OF CARE
Problem: Goal Outcome Summary  Goal: Goal Outcome Summary  Pt was cooperative with nursing assessment and medications.  She reports anxiety 5/10 and depression 8/10.  Pt denies SI/HI and hallucinations.  She also denies pain.  Pt attended group, was talking with peers.  She is worried about if she'll still have her job when she leaves here.  She has made contact with one of her children, but hasn't reached out to the others.  She walked the hallways after dinner.      Problem: Thought Process Alteration (Adult)  Goal: Improved Thought Process  Contract for safety  No harm to self or others while on unit  Will attend 75% of groups  Will agree to tx team recommendations for meds and follow up care  Will deny suicidal ideation on discharge  Reduction in depression by discharge date  Will tell staff if unable to keep self safe  Safe withdrawal from alcohol   Outcome: Therapy, progress toward functional goals is gradual  Pt did not harm self or others. She attended groups.  She denies SI.     Problem: Depression (Adult,Obstetrics,Pediatric)  Goal: Establish/Maintain Self-Care Routine  Patient will demonstrate the desired outcomes by discharge/transition of care.   Outcome: Therapy, progress toward functional goals as expected  Pt is calm and cooperative.

## 2017-04-27 NOTE — DISCHARGE INSTRUCTIONS
Behavioral Discharge Planning and Instructions    Summary: Cherelle was admitted for suicidal ideation, alcohol intoxication and SIB - cut wrists     Main Diagnosis: Major depressive disorder, recurrent, moderate, Alcohol use disorder, moderate,  R/O PTSD    Major Treatments, Procedures and Findings: Stabilize with medications, connect with community programs.    Symptoms to Report: feeling more aggressive, increased confusion, losing more sleep, mood getting worse or thoughts of suicide    Lifestyle Adjustment: Take all medications as prescribed, meet with doctor/ medication provider, out patient therapist, , and [facility] worker as scheduled. Abstain from alcohol or any unprescribed drugs.    Psychiatry Follow-up:     [facility]   Med management - April - May 11th @ 8:45   [address] [place] [address]  [phone] Fax: [phone]      [facility]  [facility] - EMDR Therapy - June 2nd @ 7:30   [address] [place] [address]  [phone] Fax: [phone]    Medication Management Resources:    [facility]  [address] [address]  [phone]  [phone]-[phone]    Therapy Resources:     [place] [address] [address]  [phone]  [phone]    Mathew Adkins  Phone- [phone]  Fax- [phone]    [facility]  [address] [place] [address]  [phone]  Fax: [phone]    CD Resources:      [facility]  Contact- Sharlene Sheriff  [address], [place]  [address]  Phone: [phone]  Fax: [phone]  [email or web address]  [address] [place]  [address]  Phone - [phone]  Fax - [phone]    Resources:   Crisis Intervention: 657.933.7309 or 547-825-1846 (TTY: 357.934.6335).  Call anytime for help.  National [place] on Mental Illness (www.[place].kevin.org): 567.781.2854 or 993-465-2579.  Alcoholics Anonymous  (www.alcoholics-anonymous.org): Check your phone book for your local chapter.  Suicide Awareness Voices of Education (SAVE) (www.save.org): 577-296-CEJE (1928)  National Suicide Prevention Line (www.mentalhealthmn.org): 793-191-OKAT (5490)  Mental Health Consumer/Survivor Network of [place] (www.mhcsn.net): 194.771.5269 or 955-643-6284  Mental Health Association of [place] (www.mentalhealth.org): 463.336.2375 or 863-114-5091    General Medication Instructions:   See your medication sheet(s) for instructions.   Take all medicines as directed.  Make no changes unless your doctor suggests them.   Go to all your doctor visits.  Be sure to have all your required lab tests. This way, your medicines can be refilled on time.  Do not use any drugs not prescribed by your doctor.  Avoid alcohol.    [place] Area:  [facility] Crisis stabilization [facility]- [phone]  [facility] Crisis Line: 1-363.882.7338  Advocates For Family Peace: 860-1125  Sexual Assault Program [facility]: 403.947.5933 or 1-982.239.2064  [facility] Battered Women's Program: 1-386.790.2348 Ext: 279       Calls answered Mon-Fri-8:00 am--4:30 pm    [place]:  Advocates for Family Peace: 1-168.801.2442  [facility] first call for help: 4-784-805-6264  [facility] Crisis Center:  (975) 388-7898      [place] Area:  Warm Line: 1-681.190.4820       Calls answered Tuesday--Saturday 4:00 pm--10:00 pm  [facility] Crisis Line - [phone]  [facility]abil[facility] 905-500-1960    [place] Statewide:  [place] Crisis and Referral Services: 2-532-981-9700  National Suicide Prevention Lifeline: 3-097-358-TALK (0693)   - brk4duue- Text  Life  to [address]  First Call for Help: 2-1-1  [facility] Helpline- 7-616-SSBJ-HELP

## 2017-04-27 NOTE — PLAN OF CARE
Face to face end of shift report received from SOFIE Rosas. Rounding completed. Patient observed in group.      Brigida Paez  4/27/2017  4:04 PM

## 2017-04-27 NOTE — PLAN OF CARE
Problem: Discharge Planning  Goal: Discharge Planning (Adult, OB, Behavioral, Peds)  Outcome: Improving  Spoke with pt this afternoon she states her depression is a little better than yesterday. Talked with pt about follow up resources now that she has met with pt financial and should qualify for MA- pt is agreeable to med management and is willing to try therapy again- will set these up. Talks about not wanting to be a burden on her kids and ask them for help because they are busy. Says she hasn't talked to her kids since being here because she couldn't remember the number- found these for her.

## 2017-04-27 NOTE — PLAN OF CARE
Problem: Goal Outcome Summary  Goal: Goal Outcome Summary  2345--in bed with eyes closed and breathing regular. 0605--still in bed with eyes closed and breathing regular.

## 2017-04-27 NOTE — PLAN OF CARE
"Problem: Goal Outcome Summary  Goal: Goal Outcome Summary  Pt was cooperative with nursing assessment and medications.  She reports 7/10 anxiety along with depression.  Pt denies SI/HI and hallucinations.  She denies pain.  Pt was tearful when talking to this writer.  She is embarrassed to be here.  \"I told myself I would never end up here again and here I am.\"  Pt doesn't want her kids to feel bad about her being her, so that is a stressor for her.  She sat by herself, but did converse with peers.  She attended groups.  She ate snack, took HS medications and went to lay down for the night.      Problem: Thought Process Alteration (Adult)  Goal: Improved Thought Process  Contract for safety  No harm to self or others while on unit  Will attend 75% of groups  Will agree to tx team recommendations for meds and follow up care  Will deny suicidal ideation on discharge  Reduction in depression by discharge date  Will tell staff if unable to keep self safe  Safe withdrawal from alcohol   Outcome: Therapy, progress toward functional goals as expected  Pt attended group.  She is still feeling depressed.  She denies SI/HI.  Pt scored 3 MSSA.      "

## 2017-04-27 NOTE — PLAN OF CARE
Face to face end of shift report received from Vahid RN. Rounding completed. Patient observed in room.     Alison Ayala  4/27/2017  7:35 AM

## 2017-04-27 NOTE — PLAN OF CARE
Face to face end of shift report received from Brigida LANDEROS RN. Rounding completed. Patient observed.     Luis Manuel Jacinto  4/27/2017  2:30 AM

## 2017-04-28 VITALS
RESPIRATION RATE: 16 BRPM | OXYGEN SATURATION: 98 % | SYSTOLIC BLOOD PRESSURE: 162 MMHG | WEIGHT: 140.2 LBS | HEART RATE: 64 BPM | DIASTOLIC BLOOD PRESSURE: 86 MMHG | TEMPERATURE: 98.1 F | HEIGHT: 64 IN | BODY MASS INDEX: 23.93 KG/M2

## 2017-04-28 PROCEDURE — 25000132 ZZH RX MED GY IP 250 OP 250 PS 637: Performed by: NURSE PRACTITIONER

## 2017-04-28 PROCEDURE — 99239 HOSP IP/OBS DSCHRG MGMT >30: CPT | Performed by: NURSE PRACTITIONER

## 2017-04-28 RX ORDER — PAROXETINE 10 MG/1
10 TABLET, FILM COATED ORAL AT BEDTIME
Qty: 30 TABLET | Refills: 0 | Status: SHIPPED | OUTPATIENT
Start: 2017-04-28 | End: 2017-10-30

## 2017-04-28 RX ADMIN — MULTIPLE VITAMINS W/ MINERALS TAB 1 TABLET: TAB at 08:30

## 2017-04-28 RX ADMIN — Medication 100 MG: at 08:30

## 2017-04-28 NOTE — PLAN OF CARE
Face to face end of shift report received from Brigida LANDEROS RN. Rounding completed. Patient observed in room.     Joycelyn Kay  4/27/2017  11:41 PM

## 2017-04-28 NOTE — PLAN OF CARE
Discharge Note    Patient Discharged to home on 4/28/2017 at 4:35 PM via Private Car accompanied by her daughter.     Patient informed of discharge instructions in AVS. Patient verbalizes understanding and denies having any questions pertaining to AVS. Patient stable at time of discharge. Patient denies SI, HI, and thoughts of self harm at time of discharge. All personal belongings returned to patient. Discharge prescriptions sent to [facility] in [place], [place] via electronic communication.     Brynn Loco  4/28/2017  4:35 PM

## 2017-04-28 NOTE — PLAN OF CARE
Problem: Goal Outcome Summary  Goal: Goal Outcome Summary  Outcome: No Change  0535-patient in bed, eyes closed, regular respirations noted. Slept with no issues.

## 2017-04-28 NOTE — PLAN OF CARE
Face to face end of shift report received from Alison LANDEROS RN. Rounding completed. Patient observed in the lounge.     Brynn Loco  4/28/2017  3:49 PM

## 2017-04-28 NOTE — PLAN OF CARE
"Problem: Goal Outcome Summary  Goal: Goal Outcome Summary  Outcome: Therapy, progress toward functional goals as expected  Pt denies SI HI hallucinations, and pain. She does admit to some depression, but  Feels better today. She also admits to anxiety 7/10 she said she has anxiety daily. Pt reports sleeping good last night and no symptoms of N/V.  She has brighter affect.  She inquired if she would be able to go home, writer informed her that she is on a hold, but will be assessed by her provider and discussed in treatment team.     PT discharging today,her daughter will  around 4:30     Problem: Thought Process Alteration (Adult)  Intervention: Optimize Communication  Pt is able to make her needs known, and she is able to understand spoken language.     Goal: Improved Thought Process  Contract for safety  No harm to self or others while on unit  Will attend 75% of groups  Will agree to tx team recommendations for meds and follow up care  Will deny suicidal ideation on discharge  Reduction in depression by discharge date  Will tell staff if unable to keep self safe  Safe withdrawal from alcohol   Outcome: Therapy, progress toward functional goals as expected  Pt is encouraged to particiapate in unit programming. She has no thoughts of self harm or harm to others. Pt verbalized that her depression has decreased since admission \" better today\"  No S/S of ETOH withdrawal. Mssa score of 5. No prn coverage needed.      "

## 2017-04-28 NOTE — PLAN OF CARE
Problem: Discharge Planning  Goal: Discharge Planning (Adult, OB, Behavioral, Peds)  Outcome: Adequate for Discharge Date Met:  04/28/17  Pt is discharging at the recommendation of the treatment team. Pt is discharging to home transported by daughter. Pt denies having any thoughts of hurting themself or anyone else. Pt denies anxiety or depression. Pt has follow up with [facility]. Discharge instructions, including; demographic sheet, psychiatric evaluation, discharge summary, and AVS were faxed to these next level of care providers by discharge planner.

## 2017-04-28 NOTE — PLAN OF CARE
Face to face end of shift report received from Joycelyn Santiago. Rounding completed. Patient observed in [facility].     Alison Ayala  4/28/2017  7:55 AM

## 2017-04-28 NOTE — DISCHARGE SUMMARY
Psychiatric Discharge Summary    Cherelle Tom MRN# [id number]   Age: 52 year old YOB: 1965     Date of Admission:  2017  Date of Discharge:  2017  Admitting Physician:  Clem Mckeon MD  Discharge Physician:  Capri Streeter CNP         Event Leading to Hospitalization and Hospital Stay   Cherelle Tom is a 52 year old single  female who presented to the [facility] ER via ambulance after her daughter called police to do a welfare check on her. Apparently Cherelle had been drinking yesterday and called her daughter and stated that she drank too much and just wanted to lay down and not wake up. Her daughter states that she kept repeating that she wanted to die, so became very concerned. Apparently she has been going through some significant stressors that have recently exacerbated her depression and anxiety. The anniversary of her 's death 6 years ago is coming up. Her father passed away on , which was [facility], and her birthday was the following day. Her father's  was on . She denies that she had any plan, though does have a history of suicide attempts in the past. She reports that ever since her  passed away she has been struggling with depression. She reports that she has a hard time falling asleep and staying asleep at night. She has little motivation to do things and struggles to function at home. She reports a decrease in appetite and has experienced nausea and vomiting since her father passed away, which she relates to stress.     Cherelle denied suicidal ideation throughout her stay and reports no plans to harm herself. She had been drinking prior to admission and did not remember making suicidal statements that night. She minimized the extent of her alcohol use, which had been daily, though is in agreement to abstain from alcohol. She denied the need for CD treatment, though resources will be provided for Rule 25 assessment. She is  advised to stop taking Clonazepam due to her ongoing alcohol use. She was able to apply for MA with the assistance of the financial aid office so will be able to afford outpatient services and medications after discharge. This had been a huge barrier for her in accessing mental health treatment prior to admission. She was started on Paxil for depression and anxiety during her stay due to the affordability of this medication until her MA is active. She would likely benefit from the use of Naltrexone or Campral to decrease urges to use alcohol. She is in agreement to get set up with medication management for further medication adjustments once she can afford the prescriptions. She would also greatly benefit from individual therapy due to recent stressors and likely PTSD diagnosis. Resources are provided for therapy, med management, and Rule 25 assessment at discharge.          At time of discharge, there is no evidence that patient is in immediate danger of self or others.        DIagnoses:     Major depressive disorder, recurrent, moderate  Alcohol use disorder, moderate  R/O PTSD         Labs:     Results for orders placed or performed during the hospital encounter of 04/25/17   Drug Screen Urine (Range)   Result Value Ref Range    Amphetamine Qual Urine  NEG     Negative   Cutoff for a negative amphetamine is 500 ng/mL or less.      Barbiturates Qual Urine  NEG     Negative   Cutoff for a negative barbiturate is 200 ng/mL or less.      Benzodiazepine Qual Urine  NEG     Negative   Cutoff for a negative benzodiazepine is 200 ng/mL or less.      Cannabinoids Qual Urine  NEG     Negative   Cutoff for a negative cannabinoid is 50 ng/mL or less.      Cocaine Qual Urine  NEG     Negative   Cutoff for a negative cocaine is 300 ng/mL or less.      Opiates Qualitative Urine  NEG     Negative   Cutoff for a negative opiate is 300 ng/mL or less.      Methadone Qual Urine  NEG     Negative   Cutoff for a negative methadone is  300 ng/mL or less.      PCP Qual Urine  NEG     Negative   Cutoff for a negative PCP is 25 ng/mL or less.     Acetaminophen level   Result Value Ref Range    Acetaminophen Level <2  Therapeutic range: 10-20 mg/L   mg/L   Alcohol ethyl   Result Value Ref Range    Ethanol g/dL 0.29 (H) 0.01 g/dL   Basic metabolic panel   Result Value Ref Range    Sodium 142 133 - 144 mmol/L    Potassium 3.9 3.4 - 5.3 mmol/L    Chloride 105 94 - 109 mmol/L    Carbon Dioxide 25 20 - 32 mmol/L    Anion Gap 12 3 - 14 mmol/L    Glucose 93 70 - 99 mg/dL    Urea Nitrogen 5 (L) 7 - 30 mg/dL    Creatinine 0.50 (L) 0.52 - 1.04 mg/dL    GFR Estimate >90  Non  GFR Calc   >60 mL/min/1.7m2    GFR Estimate If Black >90   GFR Calc   >60 mL/min/1.7m2    Calcium 8.5 8.5 - 10.1 mg/dL   CBC with platelets differential   Result Value Ref Range    WBC 5.2 4.0 - 11.0 10e9/L    RBC Count 5.08 3.8 - 5.2 10e12/L    Hemoglobin 15.3 11.7 - 15.7 g/dL    Hematocrit 45.6 35.0 - 47.0 %    MCV 90 78 - 100 fl    MCH 30.1 26.5 - 33.0 pg    MCHC 33.6 31.5 - 36.5 g/dL    RDW 13.2 10.0 - 15.0 %    Platelet Count 244 150 - 450 10e9/L    Diff Method Automated Method     % Neutrophils 48.9 %    % Lymphocytes 45.0 %    % Monocytes 4.3 %    % Eosinophils 1.0 %    % Basophils 0.4 %    % Immature Granulocytes 0.4 %    Nucleated RBCs 0 0 /100    Absolute Neutrophil 2.5 1.6 - 8.3 10e9/L    Absolute Lymphocytes 2.3 0.8 - 5.3 10e9/L    Absolute Monocytes 0.2 0.0 - 1.3 10e9/L    Absolute Eosinophils 0.1 0.0 - 0.7 10e9/L    Absolute Basophils 0.0 0.0 - 0.2 10e9/L    Abs Immature Granulocytes 0.0 0 - 0.4 10e9/L    Absolute Nucleated RBC 0.0    Salicylate level   Result Value Ref Range    Salicylate Level 6 mg/dL   INR   Result Value Ref Range    INR 0.93 0.80 - 1.20   Alcohol ethyl   Result Value Ref Range    Ethanol g/dL 0.13 (H) 0.01 g/dL                  Discharge Medications:     Current Discharge Medication List      START taking these medications     Details   PARoxetine (PAXIL) 10 MG tablet Take 1 tablet (10 mg) by mouth At Bedtime  Qty: 30 tablet, Refills: 0    Associated Diagnoses: Moderate episode of recurrent major depressive disorder (H)         STOP taking these medications       clonazePAM (KLONOPIN) 0.5 MG tablet Comments:   Reason for Stopping:                 Justification for dual anti-psychotic use: not applicable       Psychiatric Examination:   Appearance:  awake, alert and adequately groomed  Attitude:  cooperative  Eye Contact:  good  Mood:  better  Affect:  appropriate and in normal range  Speech:  clear, coherent  Psychomotor Behavior:  no evidence of tardive dyskinesia, dystonia, or tics  Thought Process:  logical, linear and goal oriented  Associations:  no loose associations  Thought Content:  no evidence of suicidal ideation or homicidal ideation and no evidence of psychotic thought  Insight:  good  Judgment:  fair  Oriented to:  time, person, and place  Attention Span and Concentration:  intact  Recent and Remote Memory:  intact  Fund of Knowledge: appropriate  Muscle Strength and Tone: normal  Gait and Station: Normal          Discharge Plan:   Psychiatry Follow-up:      [facility]   Med management - April - May 11th @ 8:45   [address] [place] [address]  [place] [address]  [phone] Fax: [phone]        [facility]  [facility] - EMDR Therapy - June 2nd @ 7:30   [address] [place] [address]  [place] [address]  [phone] Fax: [phone]     Medication Management Resources:     [place] [address] [facility] [place] [address]  [phone]  [phone]     Therapy Resources:      [place] [address] [place] [address]  [place] [address]  [phone]  [phone]     Mathew Adkins  Phone- [phone]  Fax- [phone]     [facility]  [address] [facility]  [place] [address]  [phone] Fax: [phone]     CD Resources:       [facility]- Rule 25  Contact- Sharlene Sheriff  [address]  . [facility] [place] [address]  Phone: [phone] Fax: [phone]  [email or web address]     [facility]  [address]  [place] [address]  Phone - [phone]  Fax - [phone]       Attestation:  The patient has been seen and evaluated by me,  Capri Streeter NP         Discharge Services Provided:    40 minutes spent on discharge services, including:  Final examination of patient.  Review and discussion of Hospital stay.  Instructions for continued outpatient care/goals.  Preparation of discharge records.  Preparation of medications refills and new prescriptions.

## 2017-10-17 DIAGNOSIS — F41.9 ANXIETY: ICD-10-CM

## 2017-10-17 RX ORDER — CLONAZEPAM 0.5 MG/1
TABLET ORAL
Qty: 90 TABLET | Refills: 5 | OUTPATIENT
Start: 2017-10-17

## 2017-10-17 NOTE — TELEPHONE ENCOUNTER
klonopin      Last Written Prescription Date: 2/17/16  Last Fill Quantity: 90,  # refills: 5   Last Office Visit with G, P or [facility] prescribing provider: 6/15/15

## 2017-10-30 ENCOUNTER — OFFICE VISIT (OUTPATIENT)
Dept: FAMILY MEDICINE | Facility: OTHER | Age: 52
End: 2017-10-30
Attending: PHYSICIAN ASSISTANT
Payer: COMMERCIAL

## 2017-10-30 VITALS
TEMPERATURE: 97.8 F | WEIGHT: 140 LBS | DIASTOLIC BLOOD PRESSURE: 98 MMHG | BODY MASS INDEX: 23.9 KG/M2 | OXYGEN SATURATION: 98 % | HEART RATE: 92 BPM | SYSTOLIC BLOOD PRESSURE: 164 MMHG | HEIGHT: 64 IN

## 2017-10-30 DIAGNOSIS — Z72.0 TOBACCO ABUSE: ICD-10-CM

## 2017-10-30 DIAGNOSIS — Z71.6 TOBACCO ABUSE COUNSELING: ICD-10-CM

## 2017-10-30 DIAGNOSIS — F41.1 GAD (GENERALIZED ANXIETY DISORDER): ICD-10-CM

## 2017-10-30 DIAGNOSIS — Z12.11 SPECIAL SCREENING FOR MALIGNANT NEOPLASMS, COLON: Primary | ICD-10-CM

## 2017-10-30 PROCEDURE — 99212 OFFICE O/P EST SF 10 MIN: CPT

## 2017-10-30 PROCEDURE — 99213 OFFICE O/P EST LOW 20 MIN: CPT | Performed by: PHYSICIAN ASSISTANT

## 2017-10-30 RX ORDER — CLONAZEPAM 0.5 MG/1
0.5 TABLET ORAL 3 TIMES DAILY PRN
Qty: 90 TABLET | Refills: 0 | Status: SHIPPED | OUTPATIENT
Start: 2017-10-30 | End: 2018-01-16

## 2017-10-30 RX ORDER — CLONAZEPAM 0.5 MG/1
TABLET ORAL
COMMUNITY
Start: 2007-12-17 | End: 2017-10-30

## 2017-10-30 ASSESSMENT — ANXIETY QUESTIONNAIRES
6. BECOMING EASILY ANNOYED OR IRRITABLE: NOT AT ALL
4. TROUBLE RELAXING: NOT AT ALL
1. FEELING NERVOUS, ANXIOUS, OR ON EDGE: SEVERAL DAYS
GAD7 TOTAL SCORE: 1
7. FEELING AFRAID AS IF SOMETHING AWFUL MIGHT HAPPEN: NOT AT ALL
3. WORRYING TOO MUCH ABOUT DIFFERENT THINGS: NOT AT ALL
2. NOT BEING ABLE TO STOP OR CONTROL WORRYING: NOT AT ALL
5. BEING SO RESTLESS THAT IT IS HARD TO SIT STILL: NOT AT ALL

## 2017-10-30 ASSESSMENT — PAIN SCALES - GENERAL: PAINLEVEL: NO PAIN (0)

## 2017-10-30 ASSESSMENT — PATIENT HEALTH QUESTIONNAIRE - PHQ9: SUM OF ALL RESPONSES TO PHQ QUESTIONS 1-9: 0

## 2017-10-30 NOTE — PATIENT INSTRUCTIONS

## 2017-10-30 NOTE — NURSING NOTE
"Chief Complaint   Patient presents with     Recheck Medication     klonopin-pt is requesting a refill       Initial BP (!) 164/98  Pulse 92  Temp 97.8  F (36.6  C)  Ht 5' 4\" (1.626 m)  Wt 140 lb (63.5 kg)  SpO2 98%  BMI 24.03 kg/m2 Estimated body mass index is 24.03 kg/(m^2) as calculated from the following:    Height as of this encounter: 5' 4\" (1.626 m).    Weight as of this encounter: 140 lb (63.5 kg).  Medication Reconciliation: complete   Maeve Sharma    "

## 2017-10-30 NOTE — MR AVS SNAPSHOT
After Visit Summary   10/30/2017    Cherelle Tom    MRN: [id number]           Patient Information     Date Of Birth          1965        Visit Information        Provider Department      10/30/2017 8:45 AM Yue Foote PA [facility]        Today's Diagnoses     Special screening for malignant neoplasms, colon    -  1    Tobacco abuse        Tobacco abuse counseling        EMILE (generalized anxiety disorder)          Care Instructions      HOW TO QUIT SMOKING  Smoking is one of the hardest habits to break. About half of all those who have ever smoked have been able to quit, and most of those (about 70%) who still smoke want to quit. Here are some of the best ways to stop smoking.     KEEP TRYING:  It takes most smokers about 8 tries before they are finally able to fully quit. So, the more often you try and fail, the better your chance of quitting the next time! So, don't give up!    GO COLD TURKEY:  Most ex-smokers quit cold turkey. Trying to cut back gradually doesn't seem to work as well, perhaps because it continues the smoking habit. Also, it is possible to fool yourself by inhaling more while smoking fewer cigarettes. This results in the same amount of nicotine in your body!    GET SUPPORT:  Support programs can make an important difference, especially for the heavy smoker. These groups offer lectures, methods to change your behavior and peer support. Call the free national Quitline for more information. 800-QUIT-NOW (117-703-7769). Low-cost or free programs are offered by many hospitals, local chapters of the American Lung Association (885-969-8721) and the American Cancer Society (683-388-0316). Support at home is important too. Non-smokers can help by offering praise and encouragement. If the smoker fails to quit, encourage them to try again!    OVER-THE-COUNTER MEDICINES:  For those who can't quit on their own, Nicotine Replacement Therapy (NRT) may make quitting  much easier. Certain aids such as the nicotine patch, gum and lozenge are available without a prescription. However, it is best to use these under the guidance of your doctor. The skin patch provides a steady supply of nicotine to the body. Nicotine gum and lozenge gives temporary bursts of low levels of nicotine. Both methods take the edge off the craving for cigarettes. WARNING: If you feel symptoms of nicotine overdose, such as nausea, vomiting, dizziness, weakness, or fast heartbeat, stop using these and see your doctor.    PRESCRIPTION MEDICINES:  After evaluating your smoking patterns and prior attempts at quitting, your doctor may offer a prescription medicine such as bupropion (Zyban, Wellbutrin), varenicline (Chantix, Champix), a niocotine inhaler or nasal spray. Each has its unique advantage and side effects which your doctor can review with you.    HEALTH BENEFITS OF QUITTING:  The benefits of quitting start right away and keep improving the longer you go without smokin minutes: blood pressure and pulse return to normal  8 hours: oxygen levels return to normal  2 days: ability to smell and taste begins to improve as damaged nerves start to regrow  2-3 weeks: circulation and lung function improves  1-9 months: decreased cough, congestion and shortness of breath; less tired  1 year: risk of heart attack decreases by half  5 years: risk of lung cancer decreases by half; risk of stroke becomes the same as a non-smoker  For information about how to quit smoking, visit the following links:  National Cancer [employer] ,   Clearing the Air, Quit Smoking Today   - an online booklet. http://www.smokefree.gov/pubs/clearing_the_air.pdf  Smokefree.gov http://smokefree.gov/  QuitNet http://www.quitnet.com/    4889-8345 [employer], [address] [place] [address], [place] [address]. All rights reserved. This information is not intended as a substitute for professional medical care. Always follow your healthcare  professional's instructions.    The Benefits of Living Smoke Free  What do you want to gain from quitting? Check off some reasons to quit.  Health Benefits  ___ Reduce my risk of lung cancer, heart disease, chronic lung disease  ___ Have fewer wrinkles and softer skin  ___ Improve my sense of taste and smell  ___ For pregnant women--reduce the risk of having a miscarriage, stillbirth, premature birth, or low-birth-weight baby  Personal Benefits  ___ Feel more in control of my life  ___ Have better-smelling hair, breath, clothes, home, and car  ___ Save time by not having to take smoke breaks, buy cigarettes, or hunt for a light  ___ Have whiter teeth  Family Benefits  ___ Reduce my children s respiratory tract infections  ___ Set a good example for my children  ___ Reduce my family s cancer risk  Financial Benefits  ___ Save hundreds of dollars each year that would be spent on cigarettes  ___ Save money on medical bills  ___ Save on life, health, and car insurance premiums    Those Dollars Add Up!  Cigarettes are expensive, and getting more expensive all the time. Do you realize how much money you are spending on cigarettes per year? What is the average amount you spend on a pack of cigarettes? What is the average number of packs that you smoke per day? Using your answers to these questions, fill in this formula to help you find out:  ($ _____ per pack) ×  ( _____ number of packs per day) × (365 days) =  $ _____ yearly cost of smoking  Besides tobacco, there are other costs, including extra cleaning bills and replacement costs for clothing and furniture; medical expenses for smoking-related illnesses; and higher health, life, and car insurance premiums.    Cigars and Pipes Count Too!  Cigars and pipes are also dangerous. So are smokeless (chewing) tobacco and snuff. All of these products contain nicotine, a highly addictive substance that has harmful effects on your body. Quitting smoking means giving up all tobacco  "products.      8788-4751 [facility], [address] [place] [address], [place] [address]. All rights reserved. This information is not intended as a substitute for professional medical care. Always follow your healthcare professional's instructions.          Follow-ups after your visit        Future tests that were ordered for you today     Open Future Orders        Priority Expected Expires Ordered    Immunos occult blood Routine  10/30/2018 10/30/2017            Who to contact     If you have questions or need follow up information about today's clinic visit or your schedule please contact [facility] directly at [phone].  Normal or non-critical lab and imaging results will be communicated to you by Pensqrhart, letter or phone within 4 business days after the clinic has received the results. If you do not hear from us within 7 days, please contact the clinic through Zee Learnt or phone. If you have a critical or abnormal lab result, we will notify you by phone as soon as possible.  Submit refill requests through GITR or call your pharmacy and they will forward the refill request to us. Please allow 3 business days for your refill to be completed.          Additional Information About Your Visit        MyChart Information     GITR lets you send messages to your doctor, view your test results, renew your prescriptions, schedule appointments and more. To sign up, go to [email or web address] . Click on \"Log in\" on the left side of the screen, which will take you to the Welcome page. Then click on \"Sign up Now\" on the right side of the page.     You will be asked to enter the access code listed below, as well as some personal information. Please follow the directions to create your username and password.     Your access code is: [email or web address]  Expires: 2018  9:13 AM     Your access code will  in 90 days. If you need help or a new code, please call your [facility] or [phone].      " "  Care EveryWhere ID     This is your Care EveryWhere ID. This could be used by other organizations to access your [facility] medical records  [id number]        Your Vitals Were     Pulse Temperature Height Pulse Oximetry BMI (Body Mass Index)       92 97.8  F (36.6  C) 5' 4\" (1.626 m) 98% 24.03 kg/m2        Blood Pressure from Last 3 Encounters:   10/30/17 (!) 164/98   04/28/17 162/86   09/08/15 150/80    Weight from Last 3 Encounters:   10/30/17 140 lb (63.5 kg)   04/26/17 140 lb 3.2 oz (63.6 kg)   09/08/15 130 lb (59 kg)              We Performed the Following     Tobacco Cessation - for Health Maintenance          Today's Medication Changes          These changes are accurate as of: 10/30/17  9:13 AM.  If you have any questions, ask your nurse or doctor.               These medicines have changed or have updated prescriptions.        Dose/Directions    clonazePAM 0.5 MG tablet   Commonly known as:  klonoPIN   This may have changed:    - how much to take  - when to take this  - reasons to take this   Used for:  EMILE (generalized anxiety disorder)   Changed by:  Yue Foote PA        Dose:  0.5 mg   Take 1 tablet (0.5 mg) by mouth 3 times daily as needed for anxiety   Quantity:  90 tablet   Refills:  0            Where to get your medicines      Some of these will need a paper prescription and others can be bought over the counter.  Ask your nurse if you have questions.     Bring a paper prescription for each of these medications     clonazePAM 0.5 MG tablet                Primary Care Provider Office Phone # Fax #    BHAVIN Angel [phone]       [facility] [place] [address]  [place] [address]        Equal Access to Services     [facility] Hospital[facility]IGITTE AH: Hadii sandra nolano Sogabriel, waaxda luqadaha, qaybta kaalmada adeegyada, mannie crumpn asuncion kimball. So [facility] [phone].    ATENCIÓN: Si habla español, tiene a alves disposición servicios gratuitos de asistencia " lingüística. Ophelia al [phone].    We comply with applicable federal civil rights laws and [place] laws. We do not discriminate on the basis of race, color, national origin, age, disability, sex, sexual orientation, or gender identity.            Thank you!     Thank you for choosing [facility]  for your care. Our goal is always to provide you with excellent care. Hearing back from our patients is one way we can continue to improve our services. Please take a few minutes to complete the written survey that you may receive in the mail after your visit with us. Thank you!             Your Updated Medication List - Protect others around you: Learn how to safely use, store and throw away your medicines at www.disposemymeds.org.          This list is accurate as of: 10/30/17  9:13 AM.  Always use your most recent med list.                   Brand Name Dispense Instructions for use Diagnosis    clonazePAM 0.5 MG tablet    klonoPIN    90 tablet    Take 1 tablet (0.5 mg) by mouth 3 times daily as needed for anxiety    EMILE (generalized anxiety disorder)

## 2017-10-30 NOTE — PROGRESS NOTES
Subjective:  Cherelle Tom is a 52 year old female  for f/u of anxiety. Taking Klonopin 0.5 mg 2-3 times daily. Feels well when she takes it. Now out of med.. No adverse side effects.    Active diagnoses this visit:     Tobacco abuse  Tobacco abuse counseling  Special screening for malignant neoplasms, colon    Review Of Systems  Psych: As above    PMH, PSH, FH reviewed and unchanged    Current Outpatient Prescriptions   Medication     clonazePAM (KLONOPIN) 0.5 MG tablet     No current facility-administered medications for this visit.        No Known Allergies      Objective:  B/P: 164/98, T: 97.8, P: 92, R: Data Unavailable    Physical Exam:  Constitutional: Alert, cooperative, orientated to place and situation  Psych: Mood is predominately euthymic with corresponding affect. Normal tone, rate and volume of speech. Goal directed thought process. Normal thought content. Patient shows good insight and judgement. Denies homicidal or suicidal ideation, intent or plan. PHQ-9 score 0. EMILE 1.    Assessment and Plan:  (Z12.11) Special screening for malignant neoplasms, colon  (primary encounter diagnosis)  Plan: Immunos occult blood            (Z72.0) Tobacco abus  Plan: Tobacco Cessation - for Health Maintenance            (Z71.6) Tobacco abuse counselin    (F41.1) EMILE (generalized anxiety disorder)  Comment: refill with 3 week f/y for physical  Plan: clonazePAM (KLONOPIN) 0.5 MG tablet                Follow 2-3 weeks, or if symptoms persist or worsen  A total of 15 minutes is spent with patient with greater than 60% spent on above plan. We discussed risks and benefits of medications and usual side effects to watch for.    Yue Foote

## 2017-10-31 ASSESSMENT — ANXIETY QUESTIONNAIRES: GAD7 TOTAL SCORE: 1

## 2017-11-26 ENCOUNTER — HEALTH MAINTENANCE LETTER (OUTPATIENT)
Age: 52
End: 2017-11-26

## 2017-12-20 ENCOUNTER — TELEPHONE (OUTPATIENT)
Dept: FAMILY MEDICINE | Facility: OTHER | Age: 52
End: 2017-12-20

## 2018-01-12 NOTE — TELEPHONE ENCOUNTER
Pt has an appt next week and will either bring in the ifob or we can give her a new one at that appt.

## 2018-01-15 ENCOUNTER — TELEPHONE (OUTPATIENT)
Dept: FAMILY MEDICINE | Facility: OTHER | Age: 53
End: 2018-01-15

## 2018-01-15 NOTE — TELEPHONE ENCOUNTER
Patient called requesting medication refill, has been advised previously that she needs fu appt.  Pt. Is scheduled I [place] for fu.  Hailey Garcia

## 2018-01-16 DIAGNOSIS — F41.1 GAD (GENERALIZED ANXIETY DISORDER): ICD-10-CM

## 2018-01-16 NOTE — TELEPHONE ENCOUNTER
klonopin      Last Written Prescription Date:  10/30/17  Last Fill Quantity: 90,   # refills: 0  Last Office Visit: 10/30/17  Future Office visit:    Next 5 appointments (look out 90 days)     Jan 19, 2018  2:10 PM CST   (Arrive by 1:55 PM)   SHORT with ART Miller CNP   [facility] ([facility] )    [address] [facility] [address]   [phone]                   Routing refill request to provider for review/approval because:  Drug not on the [facility], [facility] or  Health refill protocol or controlled substance

## 2018-01-17 RX ORDER — CLONAZEPAM 0.5 MG/1
0.5 TABLET ORAL 3 TIMES DAILY PRN
Qty: 90 TABLET | Refills: 0 | Status: SHIPPED | OUTPATIENT
Start: 2018-01-17 | End: 2018-03-20

## 2018-03-06 ENCOUNTER — RADIANT APPOINTMENT (OUTPATIENT)
Dept: GENERAL RADIOLOGY | Facility: OTHER | Age: 53
End: 2018-03-06
Attending: PHYSICIAN ASSISTANT
Payer: COMMERCIAL

## 2018-03-06 ENCOUNTER — OFFICE VISIT (OUTPATIENT)
Dept: FAMILY MEDICINE | Facility: OTHER | Age: 53
End: 2018-03-06
Attending: PHYSICIAN ASSISTANT
Payer: COMMERCIAL

## 2018-03-06 VITALS
HEIGHT: 64 IN | OXYGEN SATURATION: 98 % | DIASTOLIC BLOOD PRESSURE: 90 MMHG | RESPIRATION RATE: 16 BRPM | WEIGHT: 141 LBS | SYSTOLIC BLOOD PRESSURE: 162 MMHG | HEART RATE: 128 BPM | TEMPERATURE: 97.4 F | BODY MASS INDEX: 24.07 KG/M2

## 2018-03-06 DIAGNOSIS — Z71.6 TOBACCO ABUSE COUNSELING: ICD-10-CM

## 2018-03-06 DIAGNOSIS — M54.16 LUMBAR RADICULOPATHY: Primary | ICD-10-CM

## 2018-03-06 DIAGNOSIS — Z72.0 TOBACCO ABUSE: ICD-10-CM

## 2018-03-06 DIAGNOSIS — M54.16 LUMBAR RADICULOPATHY: ICD-10-CM

## 2018-03-06 PROCEDURE — 99213 OFFICE O/P EST LOW 20 MIN: CPT | Performed by: PHYSICIAN ASSISTANT

## 2018-03-06 PROCEDURE — G0463 HOSPITAL OUTPT CLINIC VISIT: HCPCS

## 2018-03-06 PROCEDURE — 72100 X-RAY EXAM L-S SPINE 2/3 VWS: CPT | Mod: TC,FY

## 2018-03-06 RX ORDER — CYCLOBENZAPRINE HCL 10 MG
10 TABLET ORAL
Qty: 14 TABLET | Refills: 0 | Status: SHIPPED | OUTPATIENT
Start: 2018-03-06 | End: 2018-03-19

## 2018-03-06 RX ORDER — TRAMADOL HYDROCHLORIDE 50 MG/1
TABLET ORAL
Qty: 20 TABLET | Refills: 0 | Status: SHIPPED | OUTPATIENT
Start: 2018-03-06 | End: 2018-03-20

## 2018-03-06 RX ORDER — NAPROXEN 500 MG/1
500 TABLET ORAL 2 TIMES DAILY PRN
Qty: 60 TABLET | Refills: 0 | Status: SHIPPED | OUTPATIENT
Start: 2018-03-06 | End: 2018-08-28

## 2018-03-06 ASSESSMENT — ANXIETY QUESTIONNAIRES
2. NOT BEING ABLE TO STOP OR CONTROL WORRYING: NOT AT ALL
7. FEELING AFRAID AS IF SOMETHING AWFUL MIGHT HAPPEN: NOT AT ALL
GAD7 TOTAL SCORE: 4
3. WORRYING TOO MUCH ABOUT DIFFERENT THINGS: NOT AT ALL
1. FEELING NERVOUS, ANXIOUS, OR ON EDGE: SEVERAL DAYS
6. BECOMING EASILY ANNOYED OR IRRITABLE: NOT AT ALL
IF YOU CHECKED OFF ANY PROBLEMS ON THIS QUESTIONNAIRE, HOW DIFFICULT HAVE THESE PROBLEMS MADE IT FOR YOU TO DO YOUR WORK, TAKE CARE OF THINGS AT HOME, OR GET ALONG WITH OTHER PEOPLE: SOMEWHAT DIFFICULT
5. BEING SO RESTLESS THAT IT IS HARD TO SIT STILL: NOT AT ALL

## 2018-03-06 ASSESSMENT — PAIN SCALES - GENERAL: PAINLEVEL: WORST PAIN (10)

## 2018-03-06 ASSESSMENT — PATIENT HEALTH QUESTIONNAIRE - PHQ9: 5. POOR APPETITE OR OVEREATING: NEARLY EVERY DAY

## 2018-03-06 NOTE — PATIENT INSTRUCTIONS

## 2018-03-06 NOTE — NURSING NOTE
"Chief Complaint   Patient presents with     Musculoskeletal Problem     Started about 2 weeks ago worsening, right leg pain. Denies injury.       Initial /80  Pulse 128  Temp 97.4  F (36.3  C) (Tympanic)  Resp 16  Ht 5' 4\" (1.626 m)  Wt 141 lb (64 kg)  SpO2 98%  BMI 24.2 kg/m2 Estimated body mass index is 24.2 kg/(m^2) as calculated from the following:    Height as of this encounter: 5' 4\" (1.626 m).    Weight as of this encounter: 141 lb (64 kg).  Medication Reconciliation: complete   Angela Sanches      "

## 2018-03-06 NOTE — MR AVS SNAPSHOT
After Visit Summary   3/6/2018    Cherelle Tom    MRN: [id number]           Patient Information     Date Of Birth          1965        Visit Information        Provider Department      3/6/2018 8:15 AM Yue Foote PA [facility]        Today's Diagnoses     Lumbar radiculopathy    -  1    Tobacco abuse        Tobacco abuse counseling          Care Instructions      HOW TO QUIT SMOKING  Smoking is one of the hardest habits to break. About half of all those who have ever smoked have been able to quit, and most of those (about 70%) who still smoke want to quit. Here are some of the best ways to stop smoking.     KEEP TRYING:  It takes most smokers about 8 tries before they are finally able to fully quit. So, the more often you try and fail, the better your chance of quitting the next time! So, don't give up!    GO COLD TURKEY:  Most ex-smokers quit cold turkey. Trying to cut back gradually doesn't seem to work as well, perhaps because it continues the smoking habit. Also, it is possible to fool yourself by inhaling more while smoking fewer cigarettes. This results in the same amount of nicotine in your body!    GET SUPPORT:  Support programs can make an important difference, especially for the heavy smoker. These groups offer lectures, methods to change your behavior and peer support. Call the free national Quitline for more information. 800-QUIT-NOW (284-510-9912). Low-cost or free programs are offered by many hospitals, local chapters of the American Lung Association (988-997-2624) and the American Cancer Society (076-480-5919). Support at home is important too. Non-smokers can help by offering praise and encouragement. If the smoker fails to quit, encourage them to try again!    OVER-THE-COUNTER MEDICINES:  For those who can't quit on their own, Nicotine Replacement Therapy (NRT) may make quitting much easier. Certain aids such as the nicotine patch, gum and lozenge are  available without a prescription. However, it is best to use these under the guidance of your doctor. The skin patch provides a steady supply of nicotine to the body. Nicotine gum and lozenge gives temporary bursts of low levels of nicotine. Both methods take the edge off the craving for cigarettes. WARNING: If you feel symptoms of nicotine overdose, such as nausea, vomiting, dizziness, weakness, or fast heartbeat, stop using these and see your doctor.    PRESCRIPTION MEDICINES:  After evaluating your smoking patterns and prior attempts at quitting, your doctor may offer a prescription medicine such as bupropion (Zyban, Wellbutrin), varenicline (Chantix, Champix), a niocotine inhaler or nasal spray. Each has its unique advantage and side effects which your doctor can review with you.    HEALTH BENEFITS OF QUITTING:  The benefits of quitting start right away and keep improving the longer you go without smokin minutes: blood pressure and pulse return to normal  8 hours: oxygen levels return to normal  2 days: ability to smell and taste begins to improve as damaged nerves start to regrow  2-3 weeks: circulation and lung function improves  1-9 months: decreased cough, congestion and shortness of breath; less tired  1 year: risk of heart attack decreases by half  5 years: risk of lung cancer decreases by half; risk of stroke becomes the same as a non-smoker  For information about how to quit smoking, visit the following links:  National Cancer [place] ,   Clearing the Air, Quit Smoking Today   - an online booklet. http://www.smokefree.gov/pubs/clearing_the_air.pdf  Smokefree.gov http://smokefree.gov/  QuitNet http://www.quitnet.com/    4413-1473 [employer], [address] [place] [address], [place] [address]. All rights reserved. This information is not intended as a substitute for professional medical care. Always follow your healthcare professional's instructions.    The Benefits of Living Smoke Free  What do you  want to gain from quitting? Check off some reasons to quit.  Health Benefits  ___ Reduce my risk of lung cancer, heart disease, chronic lung disease  ___ Have fewer wrinkles and softer skin  ___ Improve my sense of taste and smell  ___ For pregnant women--reduce the risk of having a miscarriage, stillbirth, premature birth, or low-birth-weight baby  Personal Benefits  ___ Feel more in control of my life  ___ Have better-smelling hair, breath, clothes, home, and car  ___ Save time by not having to take smoke breaks, buy cigarettes, or hunt for a light  ___ Have whiter teeth  Family Benefits  ___ Reduce my children s respiratory tract infections  ___ Set a good example for my children  ___ Reduce my family s cancer risk  Financial Benefits  ___ Save hundreds of dollars each year that would be spent on cigarettes  ___ Save money on medical bills  ___ Save on life, health, and car insurance premiums    Those Dollars Add Up!  Cigarettes are expensive, and getting more expensive all the time. Do you realize how much money you are spending on cigarettes per year? What is the average amount you spend on a pack of cigarettes? What is the average number of packs that you smoke per day? Using your answers to these questions, fill in this formula to help you find out:  ($ _____ per pack) ×  ( _____ number of packs per day) × (365 days) =  $ _____ yearly cost of smoking  Besides tobacco, there are other costs, including extra cleaning bills and replacement costs for clothing and furniture; medical expenses for smoking-related illnesses; and higher health, life, and car insurance premiums.    Cigars and Pipes Count Too!  Cigars and pipes are also dangerous. So are smokeless (chewing) tobacco and snuff. All of these products contain nicotine, a highly addictive substance that has harmful effects on your body. Quitting smoking means giving up all tobacco products.      3064-5879 [employer], [address] [facility], [place], [place]  "[address]. All rights reserved. This information is not intended as a substitute for professional medical care. Always follow your healthcare professional's instructions.          Follow-ups after your visit        Additional Services     PHYSICAL THERAPY REFERRAL       Surekha SUAREZ    [facility] provides Physical Therapy evaluation and treatment and many specialty services across the [facility] system.  If requesting a specialty program, please choose from the list below.    If you have not heard from the scheduling office within 2 business days, please call [phone] for all locations, with the exception of [place], please call [phone] and [facility], please call [phone]  Treatment: Evaluation & Treatment  Special Instructions/Modalities:   Special Programs:   Please be aware that coverage of these services is subject to the terms and limitations of your health insurance plan.  Call member services at your health plan with any benefit or coverage questions.      **Note to Provider:  If you are referring outside of [facility] for the therapy appointment, please list the name of the location in the \"special instructions\" above, print the referral and give to the patient to schedule the appointment.                  Who to contact     If you have questions or need follow up information about today's clinic visit or your schedule please contact [facility] directly at [phone].  Normal or non-critical lab and imaging results will be communicated to you by MyChart, letter or phone within 4 business days after the clinic has received the results. If you do not hear from us within 7 days, please contact the clinic through MyChart or phone. If you have a critical or abnormal lab result, we will notify you by phone as soon as possible.  Submit refill requests through [employer] or call your pharmacy and they will forward the refill request to us. Please allow 3 business days for your " "refill to be completed.          Additional Information About Your Visit        Care EveryWhere ID     This is your Care EveryWhere ID. This could be used by other organizations to access your [facility] medical records  [id number]        Your Vitals Were     Pulse Temperature Respirations Height Pulse Oximetry BMI (Body Mass Index)    128 97.4  F (36.3  C) (Tympanic) 16 5' 4\" (1.626 m) 98% 24.2 kg/m2       Blood Pressure from Last 3 Encounters:   03/06/18 162/90   10/30/17 (!) 164/98   04/28/17 162/86    Weight from Last 3 Encounters:   03/06/18 141 lb (64 kg)   10/30/17 140 lb (63.5 kg)   04/26/17 140 lb 3.2 oz (63.6 kg)              We Performed the Following     PHYSICAL THERAPY REFERRAL     Tobacco Cessation - Order to Satisfy Health Maintenance          Today's Medication Changes          These changes are accurate as of 3/6/18  9:49 AM.  If you have any questions, ask your nurse or doctor.               Start taking these medicines.        Dose/Directions    cyclobenzaprine 10 MG tablet   Commonly known as:  FLEXERIL   Used for:  Lumbar radiculopathy   Started by:  Yue Foote PA        Dose:  10 mg   Take 1 tablet (10 mg) by mouth nightly as needed for muscle spasms   Quantity:  14 tablet   Refills:  0       naproxen 500 MG tablet   Commonly known as:  NAPROSYN   Used for:  Lumbar radiculopathy   Started by:  Yue Foote PA        Dose:  500 mg   Take 1 tablet (500 mg) by mouth 2 times daily as needed for moderate pain   Quantity:  60 tablet   Refills:  0       traMADol 50 MG tablet   Commonly known as:  ULTRAM   Used for:  Lumbar radiculopathy   Started by:  Yue Foote PA        1-2 tabs HS prn   Quantity:  20 tablet   Refills:  0            Where to get your medicines      These medications were sent to [facility] [address] [address]     Phone:  [phone]     cyclobenzaprine 10 MG tablet    naproxen 500 MG " tablet         Some of these will need a paper prescription and others can be bought over the counter.  Ask your nurse if you have questions.     Bring a paper prescription for each of these medications     traMADol 50 MG tablet                Primary Care Provider Office Phone # Fax #    BHAVIN Angel [phone]       [facility] [place] [address]  [place] [address]        Equal Access to Services     GIUSEPPE RIVERS : Hadii aad ku hadasho Soomaali, waaxda luqadaha, qaybta kaalmada adeegyada, waxay idiin hayaan adeeg kharash la'aan ah. So [facility] [phone].    ATENCIÓN: Si habla abhay, tiene a alves disposición servicios gratuitos de asistencia lingüística. Llame al [phone].    We comply with applicable federal civil rights laws and [place] laws. We do not discriminate on the basis of race, color, national origin, age, disability, sex, sexual orientation, or gender identity.            Thank you!     Thank you for choosing [facility]  for your care. Our goal is always to provide you with excellent care. Hearing back from our patients is one way we can continue to improve our services. Please take a few minutes to complete the written survey that you may receive in the mail after your visit with us. Thank you!             Your Updated Medication List - Protect others around you: Learn how to safely use, store and throw away your medicines at www.disposemymeds.org.          This list is accurate as of 3/6/18  9:49 AM.  Always use your most recent med list.                   Brand Name Dispense Instructions for use Diagnosis    clonazePAM 0.5 MG tablet    klonoPIN    90 tablet    Take 1 tablet (0.5 mg) by mouth 3 times daily as needed for anxiety    EMILE (generalized anxiety disorder)       cyclobenzaprine 10 MG tablet    FLEXERIL    14 tablet    Take 1 tablet (10 mg) by mouth nightly as needed for muscle spasms    Lumbar radiculopathy       naproxen 500 MG tablet     NAPROSYN    60 tablet    Take 1 tablet (500 mg) by mouth 2 times daily as needed for moderate pain    Lumbar radiculopathy       traMADol 50 MG tablet    ULTRAM    20 tablet    1-2 tabs HS prn    Lumbar radiculopathy

## 2018-03-06 NOTE — PROGRESS NOTES
Subjective:  HPI: Cherelle Tom is a 52 year old female who presents with a 2 week  history of low back pain on the right. Pain radiates to the right hip down to ankle. Associated  Paresthesias to right lower leg. Denies injury. Pain is a 10/10. It is worse with any activity. Symptoms started after shoveling snow.    Past Medical History:   Diagnosis Date     Alcohol abuse      Anxiety  3/21/2011     Depression 2012     Unspecified essential hypertension 2004     Past Surgical History:   Procedure Laterality Date       1991    hysterectomy       1991     HYSTERECTOMY, CHICHO       Current Outpatient Prescriptions   Medication     clonazePAM (KLONOPIN) 0.5 MG tablet     No current facility-administered medications for this visit.      Social History   Substance Use Topics     Smoking status: Current Every Day Smoker     Packs/day: 0.50     Types: Cigarettes     Smokeless tobacco: Never Used      Comment: Declined quit plan     Alcohol use 0.0 oz/week     2 - 3 Cans of beer per week      Comment: 4 beers weekly, lately couple every day       ROS:  CONSTITUTIONAL: negative for fever, chills, change in weight  MUSCULOSKELETAL: as above  NEURO: as above    Objective:  B/P: 162/90, Temperature: 97.4, Pulse: 128, Respirations: 16    GEN: healthy, alert and no distress  DERM: no suspicious lesions or rash  MS:ROM of back is decreased due to pain. Patient is NTPP over thoracic and lumbar spine. Is TTP over lumbar paraspinous area and over right SI joint. Straight leg raising is positive on the right.  NEURO: Sensation intact. Patellar and achilles DTR's 2+ and symmetric bilateral    Assessment:  (M54.16) Lumbar radiculopathy  (primary encounter diagnosis)  Comment: Loss of curvature on xray. PT and meds as below  Plan: XR LUMBAR SPINE 2/3 VIEWS (Clinic Performed),         naproxen (NAPROSYN) 500 MG tablet,         cyclobenzaprine (FLEXERIL) 10 MG tablet,         traMADol (ULTRAM) 50 MG  tablet #20, PHYSICAL         THERAPY REFERRAL            (Z72.0) Tobacco abus  Plan: Tobacco Cessation - Order to Satisfy Health         Maintenance            (Z71.6) Tobacco abuse counseling        [unfilled]:      1.  Stretching and strengthening exercises.       2.  Prn heat or ice application.      3.  Follow-up if symptoms persist or worsen.      Yue Foote PA-C

## 2018-03-07 ASSESSMENT — PATIENT HEALTH QUESTIONNAIRE - PHQ9: SUM OF ALL RESPONSES TO PHQ QUESTIONS 1-9: 7

## 2018-03-07 ASSESSMENT — ANXIETY QUESTIONNAIRES: GAD7 TOTAL SCORE: 4

## 2018-03-08 ENCOUNTER — TELEPHONE (OUTPATIENT)
Dept: FAMILY MEDICINE | Facility: OTHER | Age: 53
End: 2018-03-08

## 2018-03-08 DIAGNOSIS — M54.16 LUMBAR RADICULOPATHY: Primary | ICD-10-CM

## 2018-03-08 RX ORDER — PREDNISONE 20 MG/1
20 TABLET ORAL 2 TIMES DAILY
Qty: 10 TABLET | Refills: 0 | Status: SHIPPED | OUTPATIENT
Start: 2018-03-08 | End: 2018-08-28

## 2018-03-08 NOTE — TELEPHONE ENCOUNTER
8:00 AM    Reason for Call: Phone Call    Description: Pt calling and stating that the medication that was given is not working having lots of pain and is worse than when she was seen and hasn't slept since.    Was an appointment offered for this call? No  If yes : Appointment type              Date    Preferred method for responding to this message: Telephone Call  What is your phone number ?    If we cannot reach you directly, may we leave a detailed response at the number you provided? Yes    Can this message wait until your PCP/provider returns, if available today? No,     Elzbieta Ayala

## 2018-03-08 NOTE — TELEPHONE ENCOUNTER
Pt was seen 03/06/18 by Yuki Foote for lumbar radiculopathy and was given Naproxen, Tramadol, and Flexeril.

## 2018-03-12 ENCOUNTER — TELEPHONE (OUTPATIENT)
Dept: FAMILY MEDICINE | Facility: OTHER | Age: 53
End: 2018-03-12

## 2018-03-12 NOTE — TELEPHONE ENCOUNTER
Pt would be recommended.  I could do the referral, or else I need to see her.  Thanks.  Sanjay Camacho

## 2018-03-12 NOTE — TELEPHONE ENCOUNTER
Pt calls she saw Yuki Foote on 03/06/18 for lumbar radiculopathy and was given Naproxen. Pt called on 03/08/18 and stated this was not helping. You Rx'd 5 days of Prednisone at that time. Pt calls today and states she leg pain is no better and would like to know what to do. Pt should finish the Prednisone tomorrow.

## 2018-03-12 NOTE — TELEPHONE ENCOUNTER
I called the pt and notified. Her pain has improved since last week. Pt has to return to work tomorrow. Please schedule her for 03/19/18 at 8:30am, arriving at 8:15am. Pt aware. She will call back if needs to be seen sooner.

## 2018-03-19 ENCOUNTER — TELEPHONE (OUTPATIENT)
Dept: FAMILY MEDICINE | Facility: OTHER | Age: 53
End: 2018-03-19

## 2018-03-19 DIAGNOSIS — M54.16 LUMBAR RADICULOPATHY: ICD-10-CM

## 2018-03-19 DIAGNOSIS — F41.1 GAD (GENERALIZED ANXIETY DISORDER): ICD-10-CM

## 2018-03-19 NOTE — TELEPHONE ENCOUNTER
Pt calls the Prednisone did help with her leg pain. She is taking Naproxen 500mg bid. This is not helping enough, she is wondering if this can be taken tid, or if there is a stronger dose of this?

## 2018-03-19 NOTE — TELEPHONE ENCOUNTER
That is the max.  Could try ibu 800 tid instead.  Do not combine though.  Thanks. Sanjay Camacho

## 2018-03-20 RX ORDER — CYCLOBENZAPRINE HCL 10 MG
TABLET ORAL
Qty: 14 TABLET | Refills: 0 | Status: SHIPPED | OUTPATIENT
Start: 2018-03-20 | End: 2018-08-28

## 2018-03-20 RX ORDER — CLONAZEPAM 0.5 MG/1
0.5 TABLET ORAL 3 TIMES DAILY PRN
Qty: 90 TABLET | Refills: 0 | Status: SHIPPED | OUTPATIENT
Start: 2018-03-20 | End: 2018-05-21

## 2018-03-20 RX ORDER — TRAMADOL HYDROCHLORIDE 50 MG/1
TABLET ORAL
Qty: 20 TABLET | Refills: 0 | Status: SHIPPED | OUTPATIENT
Start: 2018-03-20 | End: 2018-08-28

## 2018-05-21 DIAGNOSIS — F41.1 GAD (GENERALIZED ANXIETY DISORDER): ICD-10-CM

## 2018-05-21 RX ORDER — CLONAZEPAM 0.5 MG/1
0.5 TABLET ORAL 3 TIMES DAILY PRN
Qty: 90 TABLET | Refills: 0 | Status: SHIPPED | OUTPATIENT
Start: 2018-05-21 | End: 2018-07-26

## 2018-05-21 NOTE — TELEPHONE ENCOUNTER
klonopin      Last Written Prescription Date:  3/20/18  Last Fill Quantity: 90,   # refills: 0  Last Office Visit: 3/6/18  Future Office visit:       Routing refill request to provider for review/approval because:  Drug not on the [facility], P or [facility] refill protocol or controlled substance  '

## 2018-07-26 DIAGNOSIS — F41.1 GAD (GENERALIZED ANXIETY DISORDER): ICD-10-CM

## 2018-07-26 RX ORDER — CLONAZEPAM 0.5 MG/1
0.5 TABLET ORAL 3 TIMES DAILY PRN
Qty: 90 TABLET | Refills: 0 | Status: SHIPPED | OUTPATIENT
Start: 2018-07-26 | End: 2018-08-28

## 2018-07-26 NOTE — TELEPHONE ENCOUNTER
klonopin      Last Written Prescription Date:  5/21/18  Last Fill Quantity: 90,   # refills: 0  Last Office Visit: 3/6/18  Future Office visit:       Routing refill request to provider for review/approval because:  Drug not on the [facility], P or [facility] refill protocol or controlled substance

## 2018-08-28 ENCOUNTER — RADIANT APPOINTMENT (OUTPATIENT)
Dept: GENERAL RADIOLOGY | Facility: OTHER | Age: 53
End: 2018-08-28
Attending: PHYSICIAN ASSISTANT
Payer: COMMERCIAL

## 2018-08-28 ENCOUNTER — OFFICE VISIT (OUTPATIENT)
Dept: FAMILY MEDICINE | Facility: OTHER | Age: 53
End: 2018-08-28
Attending: PHYSICIAN ASSISTANT
Payer: COMMERCIAL

## 2018-08-28 VITALS
HEART RATE: 98 BPM | WEIGHT: 140 LBS | TEMPERATURE: 98.6 F | SYSTOLIC BLOOD PRESSURE: 164 MMHG | OXYGEN SATURATION: 92 % | DIASTOLIC BLOOD PRESSURE: 90 MMHG | BODY MASS INDEX: 23.9 KG/M2 | HEIGHT: 64 IN

## 2018-08-28 DIAGNOSIS — I10 BENIGN ESSENTIAL HYPERTENSION: Primary | ICD-10-CM

## 2018-08-28 DIAGNOSIS — F41.1 GAD (GENERALIZED ANXIETY DISORDER): ICD-10-CM

## 2018-08-28 DIAGNOSIS — R05.9 COUGH: ICD-10-CM

## 2018-08-28 LAB
BASOPHILS # BLD AUTO: 0 10E9/L (ref 0–0.2)
BASOPHILS NFR BLD AUTO: 0.5 %
DIFFERENTIAL METHOD BLD: NORMAL
EOSINOPHIL # BLD AUTO: 0.1 10E9/L (ref 0–0.7)
EOSINOPHIL NFR BLD AUTO: 1.5 %
ERYTHROCYTE [DISTWIDTH] IN BLOOD BY AUTOMATED COUNT: 14 % (ref 10–15)
HCT VFR BLD AUTO: 44.4 % (ref 35–47)
HGB BLD-MCNC: 15 G/DL (ref 11.7–15.7)
LYMPHOCYTES # BLD AUTO: 2 10E9/L (ref 0.8–5.3)
LYMPHOCYTES NFR BLD AUTO: 33 %
MCH RBC QN AUTO: 30.3 PG (ref 26.5–33)
MCHC RBC AUTO-ENTMCNC: 33.8 G/DL (ref 31.5–36.5)
MCV RBC AUTO: 90 FL (ref 78–100)
MONOCYTES # BLD AUTO: 0.5 10E9/L (ref 0–1.3)
MONOCYTES NFR BLD AUTO: 7.6 %
NEUTROPHILS # BLD AUTO: 3.4 10E9/L (ref 1.6–8.3)
NEUTROPHILS NFR BLD AUTO: 57.4 %
PLATELET # BLD AUTO: 236 10E9/L (ref 150–450)
RBC # BLD AUTO: 4.95 10E12/L (ref 3.8–5.2)
WBC # BLD AUTO: 5.9 10E9/L (ref 4–11)

## 2018-08-28 PROCEDURE — G0463 HOSPITAL OUTPT CLINIC VISIT: HCPCS

## 2018-08-28 PROCEDURE — 80048 BASIC METABOLIC PNL TOTAL CA: CPT | Mod: ZL | Performed by: PHYSICIAN ASSISTANT

## 2018-08-28 PROCEDURE — 36415 COLL VENOUS BLD VENIPUNCTURE: CPT | Mod: ZL | Performed by: PHYSICIAN ASSISTANT

## 2018-08-28 PROCEDURE — 85025 COMPLETE CBC W/AUTO DIFF WBC: CPT | Mod: ZL | Performed by: PHYSICIAN ASSISTANT

## 2018-08-28 PROCEDURE — 71046 X-RAY EXAM CHEST 2 VIEWS: CPT | Mod: TC,FY

## 2018-08-28 PROCEDURE — 80061 LIPID PANEL: CPT | Mod: ZL | Performed by: PHYSICIAN ASSISTANT

## 2018-08-28 PROCEDURE — 84443 ASSAY THYROID STIM HORMONE: CPT | Mod: ZL | Performed by: PHYSICIAN ASSISTANT

## 2018-08-28 PROCEDURE — 99214 OFFICE O/P EST MOD 30 MIN: CPT | Performed by: PHYSICIAN ASSISTANT

## 2018-08-28 RX ORDER — ALBUTEROL SULFATE 90 UG/1
2 AEROSOL, METERED RESPIRATORY (INHALATION) EVERY 6 HOURS PRN
Qty: 1 INHALER | Refills: 1 | Status: SHIPPED | OUTPATIENT
Start: 2018-08-28 | End: 2019-01-01

## 2018-08-28 RX ORDER — AMLODIPINE BESYLATE 5 MG/1
5 TABLET ORAL DAILY
Qty: 30 TABLET | Refills: 1 | Status: SHIPPED | OUTPATIENT
Start: 2018-08-28 | End: 2019-01-01

## 2018-08-28 RX ORDER — CLONAZEPAM 0.5 MG/1
0.5 TABLET ORAL 3 TIMES DAILY PRN
Qty: 90 TABLET | Refills: 5 | Status: SHIPPED | OUTPATIENT
Start: 2018-08-28 | End: 2019-01-01

## 2018-08-28 ASSESSMENT — ANXIETY QUESTIONNAIRES
6. BECOMING EASILY ANNOYED OR IRRITABLE: SEVERAL DAYS
7. FEELING AFRAID AS IF SOMETHING AWFUL MIGHT HAPPEN: SEVERAL DAYS
GAD7 TOTAL SCORE: 4
3. WORRYING TOO MUCH ABOUT DIFFERENT THINGS: NOT AT ALL
1. FEELING NERVOUS, ANXIOUS, OR ON EDGE: SEVERAL DAYS
2. NOT BEING ABLE TO STOP OR CONTROL WORRYING: SEVERAL DAYS
5. BEING SO RESTLESS THAT IT IS HARD TO SIT STILL: NOT AT ALL

## 2018-08-28 ASSESSMENT — PAIN SCALES - GENERAL: PAINLEVEL: NO PAIN (0)

## 2018-08-28 ASSESSMENT — PATIENT HEALTH QUESTIONNAIRE - PHQ9: 5. POOR APPETITE OR OVEREATING: NOT AT ALL

## 2018-08-28 NOTE — NURSING NOTE
"Chief Complaint   Patient presents with     Refill Request     Hypertension       Initial /90  Pulse 98  Temp 98.6  F (37  C) (Tympanic)  Ht 5' 4\" (1.626 m)  Wt 140 lb (63.5 kg)  SpO2 92%  BMI 24.03 kg/m2 Estimated body mass index is 24.03 kg/(m^2) as calculated from the following:    Height as of this encounter: 5' 4\" (1.626 m).    Weight as of this encounter: 140 lb (63.5 kg).  Medication Reconciliation: complete    Melvi Barnard LPN  "

## 2018-08-28 NOTE — PROGRESS NOTES
SUBJECTIVE:   Cherelle Tom is a 53 year old female who presents to clinic today for the following health issues: anxiety f/u. Doing well with Klonopin. No concern. Next 3 month history of cough with some SOB. No fever. Smokes 1/2 ppd        Depression and Anxiety Follow-Up    Status since last visit: Worsened Feel panicky    Other associated symptoms:None    Complicating factors:     Significant life event: Yes-  Daughter , and brother passed     Current substance abuse: Alcohol    PHQ-9 10/30/2017 3/6/2018   Total Score 0 7   Q9: Suicide Ideation Not at all Not at all     EMILE-7 SCORE 10/30/2017 3/6/2018   Total Score 1 4       PHQ-9  English  PHQ-9   Any Language  EMILE-7  Suicide Assessment Five-step Evaluation and Treatment (SAFE-T)    Amount of exercise or physical activity: 4-5 days/week for an average of 30-45 minutes    Problems taking medications regularly: No    Medication side effects: none    Diet: regular (no restrictions)        Hypertension Follow-up      Outpatient blood pressures are not being checked.    Low Salt Diet: no added salt      Problem list and histories reviewed & adjusted, as indicated.  Additional history: as documented    Patient Active Problem List   Diagnosis     Anxiety state     Depression     Suicide attempt     Suicide (H)     Transient cerebral ischemia     HTN (hypertension)     Suicidal behavior     Alcohol dependence (H)     Advanced care planning/counseling discussion     Alcohol abuse     Suicidal ideation     Past Surgical History:   Procedure Laterality Date       1991    hysterectomy       1991     HYSTERECTOMY, CHICHO         Social History   Substance Use Topics     Smoking status: Current Every Day Smoker     Packs/day: 0.50     Types: Cigarettes     Smokeless tobacco: Never Used     Alcohol use 0.0 oz/week     2 - 3 Cans of beer per week      Comment: 4 beers weekly, lately couple every day     Family History   Problem Relation Age of  "Onset     Cancer Mother      Lung     Hypertension Mother      C.A.D. Father 79     Cerebrovascular Disease Father      CVA     HEART DISEASE Father      Disease     Cancer Sister 52     Lymphona, cause of death         Current Outpatient Prescriptions   Medication Sig Dispense Refill     amLODIPine (NORVASC) 5 MG tablet Take 1 tablet (5 mg) by mouth daily 30 tablet 1     clonazePAM (KLONOPIN) 0.5 MG tablet Take 1 tablet (0.5 mg) by mouth 3 times daily as needed for anxiety 90 tablet 5     [DISCONTINUED] clonazePAM (KLONOPIN) 0.5 MG tablet Take 1 tablet (0.5 mg) by mouth 3 times daily as needed for anxiety 90 tablet 0     No Known Allergies    Reviewed and updated as needed this visit by clinical staff       Reviewed and updated as needed this visit by Provider         ROS:  Constitutional, HEENT, cardiovascular, pulmonary, gi and gu systems are negative, except as otherwise noted.    OBJECTIVE:                                                    /90  Pulse 98  Temp 98.6  F (37  C) (Tympanic)  Ht 5' 4\" (1.626 m)  Wt 140 lb (63.5 kg)  SpO2 92%  BMI 24.03 kg/m2  Body mass index is 24.03 kg/(m^2).         Physical Exam:  Constitutional: healthy, alert and no acute distress  Skin: No suspicious rash or skin lesion  ENT: Posterior pharynx moist and pink without edema or exudate.  EAC's clear. TM's intact bilateral.  CV: RRR. No murmur  Pulm: Lungs clear to auscultation without wheeze, rales or rhonchi  Psych: mentation and affect appear normal                   ASSESSMENT/PLAN:                                                    (I10) Benign essential hypertension  (primary encounter diagnosis)  Comment: Start 5 mg with 2 week f/u  Plan: amLODIPine (NORVASC) 5 MG tablet, Basic         metabolic panel, Lipid Profile            (F41.1) EMILE (generalized anxiety disorder)  Comment: RF  Plan: clonazePAM (KLONOPIN) 0.5 MG tablet, TSH with         free T4 reflex            (R05) Cough  Comment: CXR normal. Trial " Ventolin inhaler  Plan: XR CHEST 2 VW (Clinic Performed), CBC with         platelets and differential, albuterol (PROAIR         HFA/PROVENTIL HFA/VENTOLIN HFA) 108 (90 Base)         MCG/ACT inhaler                Follow up as discussed      BHAVIN Joshi  [facility]

## 2018-08-28 NOTE — MR AVS SNAPSHOT
"              After Visit Summary   2018    Cherelle Tom    MRN: [id number]           Patient Information     Date Of Birth          1965        Visit Information        Provider Department      2018 4:00 PM Yue Foote PA [facility]        Today's Diagnoses     Benign essential hypertension    -  1    EMILE (generalized anxiety disorder)        Cough          Care Instructions    Follow up as discussed            Follow-ups after your visit        Who to contact     If you have questions or need follow up information about today's clinic visit or your schedule please contact [facility] directly at [phone].  Normal or non-critical lab and imaging results will be communicated to you by MyChart, letter or phone within 4 business days after the clinic has received the results. If you do not hear from us within 7 days, please contact the clinic through [employer] or phone. If you have a critical or abnormal lab result, we will notify you by phone as soon as possible.  Submit refill requests through [employer] or call your pharmacy and they will forward the refill request to us. Please allow 3 business days for your refill to be completed.          Additional Information About Your Visit        MyChart Information     [employer] lets you send messages to your doctor, view your test results, renew your prescriptions, schedule appointments and more. To sign up, go to [email or web address] . Click on \"Log in\" on the left side of the screen, which will take you to the Welcome page. Then click on \"Sign up Now\" on the right side of the page.     You will be asked to enter the access code listed below, as well as some personal information. Please follow the directions to create your username and password.     Your access code is: [email or web address]  Expires: 2018  3:08 PM     Your access code will  in 90 days. If you need help or a new code, please call your [place] " "clinic or [phone].        Care EveryWhere ID     This is your Care EveryWhere ID. This could be used by other organizations to access your [facility] medical records  [id number]        Your Vitals Were     Pulse Temperature Height Pulse Oximetry BMI (Body Mass Index)       98 98.6  F (37  C) (Tympanic) 5' 4\" (1.626 m) 92% 24.03 kg/m2        Blood Pressure from Last 3 Encounters:   08/28/18 164/90   03/06/18 162/90   10/30/17 (!) 164/98    Weight from Last 3 Encounters:   08/28/18 140 lb (63.5 kg)   03/06/18 141 lb (64 kg)   10/30/17 140 lb (63.5 kg)              We Performed the Following     Basic metabolic panel     CBC with platelets and differential     Lipid Profile     TSH with free T4 reflex          Today's Medication Changes          These changes are accurate as of 8/28/18  4:45 PM.  If you have any questions, ask your nurse or doctor.               Start taking these medicines.        Dose/Directions    albuterol 108 (90 Base) MCG/ACT inhaler   Commonly known as:  PROAIR HFA/PROVENTIL HFA/VENTOLIN HFA   Used for:  Cough   Started by:  Yue Foote PA        Dose:  2 puff   Inhale 2 puffs into the lungs every 6 hours as needed for shortness of breath / dyspnea or wheezing   Quantity:  1 Inhaler   Refills:  1       amLODIPine 5 MG tablet   Commonly known as:  NORVASC   Used for:  Benign essential hypertension   Started by:  Yue Foote PA        Dose:  5 mg   Take 1 tablet (5 mg) by mouth daily   Quantity:  30 tablet   Refills:  1         Stop taking these medicines if you haven't already. Please contact your care team if you have questions.     cyclobenzaprine 10 MG tablet   Commonly known as:  FLEXERIL   Stopped by:  Yue Foote PA           naproxen 500 MG tablet   Commonly known as:  NAPROSYN   Stopped by:  Yue Foote PA           predniSONE 20 MG tablet   Commonly known as:  DELTASONE   Stopped by:  Yue Foote PA           traMADol 50 MG tablet   Commonly " known as:  ULTRAM   Stopped by:  Yue Foote PA                Where to get your medicines      These medications were [facility] to [facility] [place] - [address]  [address], [facility] [address]     Phone:  [phone]     albuterol 108 (90 Base) MCG/ACT inhaler    amLODIPine 5 MG tablet         Some of these will need a paper prescription and others can be bought over the counter.  Ask your nurse if you have questions.     Bring a paper prescription for each of these medications     clonazePAM 0.5 MG tablet                Primary Care Provider Office Phone # Fax #    BHAVIN Angel [phone]       [facility] [place] [address]  \[facility]\""[place] [address]        Equal Access to Services     GIUSEPPE RIVERS AH: Hadii sandra spaulding hadasho Soomaali, waaxda luqadaha, qaybta kaalmada adeegyada, waxay angelain hayrylandn asuncion kimball. So [facility] [phone].    ATENCIÓN: Si habla español, tiene a alves disposición servicios gratuitos de asistencia lingüística. Llame al [phone].    We comply with applicable federal civil rights laws and [place] laws. We do not discriminate on the basis of race, color, national origin, age, disability, sex, sexual orientation, or gender identity.            Thank you!     Thank you for choosing [facility]  for your care. Our goal is always to provide you with excellent care. Hearing back from our patients is one way we can continue to improve our services. Please take a few minutes to complete the written survey that you may receive in the mail after your visit with us. Thank you!             Your Updated Medication List - Protect others around you: Learn how to safely use, store and throw away your medicines at www.disposemymeds.org.          This list is accurate as of 8/28/18  4:45 PM.  Always use your most recent med list.                   Brand Name Dispense Instructions for use Diagnosis    albuterol 108 (90  Base) MCG/ACT inhaler    PROAIR HFA/PROVENTIL HFA/VENTOLIN HFA    1 Inhaler    Inhale 2 puffs into the lungs every 6 hours as needed for shortness of breath / dyspnea or wheezing    Cough       amLODIPine 5 MG tablet    NORVASC    30 tablet    Take 1 tablet (5 mg) by mouth daily    Benign essential hypertension       clonazePAM 0.5 MG tablet    klonoPIN    90 tablet    Take 1 tablet (0.5 mg) by mouth 3 times daily as needed for anxiety    EMILE (generalized anxiety disorder)

## 2018-08-29 LAB
ANION GAP SERPL CALCULATED.3IONS-SCNC: 8 MMOL/L (ref 3–14)
BUN SERPL-MCNC: 9 MG/DL (ref 7–30)
CALCIUM SERPL-MCNC: 8.3 MG/DL (ref 8.5–10.1)
CHLORIDE SERPL-SCNC: 104 MMOL/L (ref 94–109)
CHOLEST SERPL-MCNC: 264 MG/DL
CO2 SERPL-SCNC: 26 MMOL/L (ref 20–32)
CREAT SERPL-MCNC: 0.72 MG/DL (ref 0.52–1.04)
GFR SERPL CREATININE-BSD FRML MDRD: 84 ML/MIN/1.7M2
GLUCOSE SERPL-MCNC: 90 MG/DL (ref 70–99)
HDLC SERPL-MCNC: 76 MG/DL
LDLC SERPL CALC-MCNC: 167 MG/DL
NONHDLC SERPL-MCNC: 188 MG/DL
POTASSIUM SERPL-SCNC: 4 MMOL/L (ref 3.4–5.3)
SODIUM SERPL-SCNC: 138 MMOL/L (ref 133–144)
TRIGL SERPL-MCNC: 107 MG/DL
TSH SERPL DL<=0.005 MIU/L-ACNC: 2.92 MU/L (ref 0.4–4)

## 2018-08-29 ASSESSMENT — PATIENT HEALTH QUESTIONNAIRE - PHQ9: SUM OF ALL RESPONSES TO PHQ QUESTIONS 1-9: 5

## 2018-08-29 ASSESSMENT — ANXIETY QUESTIONNAIRES: GAD7 TOTAL SCORE: 4

## 2018-08-31 DIAGNOSIS — E78.5 HYPERLIPIDEMIA LDL GOAL <130: Primary | ICD-10-CM

## 2018-08-31 RX ORDER — ATORVASTATIN CALCIUM 20 MG/1
20 TABLET, FILM COATED ORAL DAILY
Qty: 30 TABLET | Refills: 1 | Status: SHIPPED | OUTPATIENT
Start: 2018-08-31 | End: 2019-01-01

## 2018-11-27 ENCOUNTER — HEALTH MAINTENANCE LETTER (OUTPATIENT)
Age: 53
End: 2018-11-27

## 2019-01-01 ENCOUNTER — TELEPHONE (OUTPATIENT)
Dept: FAMILY MEDICINE | Facility: OTHER | Age: 54
End: 2019-01-01

## 2019-01-01 ENCOUNTER — OFFICE VISIT (OUTPATIENT)
Dept: FAMILY MEDICINE | Facility: OTHER | Age: 54
End: 2019-01-01
Attending: PHYSICIAN ASSISTANT
Payer: COMMERCIAL

## 2019-01-01 VITALS
BODY MASS INDEX: 22.2 KG/M2 | TEMPERATURE: 99.1 F | SYSTOLIC BLOOD PRESSURE: 190 MMHG | DIASTOLIC BLOOD PRESSURE: 80 MMHG | WEIGHT: 130 LBS | OXYGEN SATURATION: 96 % | HEIGHT: 64 IN | HEART RATE: 91 BPM

## 2019-01-01 VITALS
SYSTOLIC BLOOD PRESSURE: 152 MMHG | HEART RATE: 90 BPM | DIASTOLIC BLOOD PRESSURE: 78 MMHG | WEIGHT: 130 LBS | OXYGEN SATURATION: 98 % | BODY MASS INDEX: 22.49 KG/M2

## 2019-01-01 VITALS
HEIGHT: 64 IN | SYSTOLIC BLOOD PRESSURE: 144 MMHG | DIASTOLIC BLOOD PRESSURE: 94 MMHG | OXYGEN SATURATION: 9 % | HEART RATE: 84 BPM | WEIGHT: 134 LBS | BODY MASS INDEX: 22.88 KG/M2

## 2019-01-01 DIAGNOSIS — F41.1 GAD (GENERALIZED ANXIETY DISORDER): ICD-10-CM

## 2019-01-01 DIAGNOSIS — I10 BENIGN ESSENTIAL HYPERTENSION: ICD-10-CM

## 2019-01-01 DIAGNOSIS — F34.1 DYSTHYMIA: ICD-10-CM

## 2019-01-01 DIAGNOSIS — Z72.0 TOBACCO ABUSE: ICD-10-CM

## 2019-01-01 DIAGNOSIS — F41.1 GAD (GENERALIZED ANXIETY DISORDER): Primary | ICD-10-CM

## 2019-01-01 DIAGNOSIS — Z71.6 TOBACCO ABUSE COUNSELING: ICD-10-CM

## 2019-01-01 PROCEDURE — 99213 OFFICE O/P EST LOW 20 MIN: CPT | Performed by: PHYSICIAN ASSISTANT

## 2019-01-01 PROCEDURE — G0463 HOSPITAL OUTPT CLINIC VISIT: HCPCS

## 2019-01-01 PROCEDURE — 99214 OFFICE O/P EST MOD 30 MIN: CPT | Performed by: PHYSICIAN ASSISTANT

## 2019-01-01 RX ORDER — CLONAZEPAM 0.5 MG/1
TABLET ORAL
Qty: 2 TABLET | Refills: 0 | Status: SHIPPED | OUTPATIENT
Start: 2019-01-01 | End: 2019-01-01

## 2019-01-01 RX ORDER — CLONAZEPAM 0.5 MG/1
TABLET ORAL
Qty: 90 TABLET | Refills: 1 | Status: SHIPPED | OUTPATIENT
Start: 2019-01-01 | End: 2019-01-01

## 2019-01-01 RX ORDER — CLONAZEPAM 0.5 MG/1
TABLET ORAL
Qty: 90 TABLET | Refills: 0 | Status: SHIPPED | OUTPATIENT
Start: 2019-01-01 | End: 2019-01-01

## 2019-01-01 RX ORDER — AMLODIPINE BESYLATE 5 MG/1
TABLET ORAL
Qty: 30 TABLET | Refills: 1 | Status: SHIPPED | OUTPATIENT
Start: 2019-01-01 | End: 2019-01-01

## 2019-01-01 RX ORDER — AMLODIPINE BESYLATE 5 MG/1
5 TABLET ORAL DAILY
Qty: 90 TABLET | Refills: 3 | Status: SHIPPED | OUTPATIENT
Start: 2019-01-01

## 2019-01-01 RX ORDER — CLONAZEPAM 0.5 MG/1
TABLET ORAL
Qty: 90 TABLET | Refills: 5 | Status: SHIPPED | OUTPATIENT
Start: 2019-01-01

## 2019-01-01 RX ORDER — CITALOPRAM HYDROBROMIDE 20 MG/1
TABLET ORAL
Qty: 30 TABLET | Refills: 1 | Status: SHIPPED | OUTPATIENT
Start: 2019-01-01 | End: 2019-01-01

## 2019-01-01 RX ORDER — CLONAZEPAM 0.5 MG/1
TABLET ORAL
Qty: 60 TABLET | Refills: 0 | Status: SHIPPED | OUTPATIENT
Start: 2019-01-01 | End: 2019-01-01

## 2019-01-01 RX ORDER — CITALOPRAM HYDROBROMIDE 20 MG/1
20 TABLET ORAL DAILY
Qty: 30 TABLET | Refills: 1 | Status: SHIPPED | OUTPATIENT
Start: 2019-01-01 | End: 2019-01-01

## 2019-01-01 RX ORDER — CLONAZEPAM 0.5 MG/1
TABLET ORAL
Qty: 60 TABLET | Refills: 0 | Status: CANCELLED | OUTPATIENT
Start: 2019-01-01

## 2019-01-01 RX ORDER — AMLODIPINE BESYLATE 5 MG/1
5 TABLET ORAL DAILY
Qty: 30 TABLET | Refills: 1 | Status: SHIPPED | OUTPATIENT
Start: 2019-01-01 | End: 2019-01-01

## 2019-01-01 RX ORDER — AMLODIPINE BESYLATE 5 MG/1
TABLET ORAL
Qty: 30 TABLET | Refills: 8 | Status: SHIPPED | OUTPATIENT
Start: 2019-01-01 | End: 2019-01-01

## 2019-01-01 ASSESSMENT — ANXIETY QUESTIONNAIRES
GAD7 TOTAL SCORE: 6
2. NOT BEING ABLE TO STOP OR CONTROL WORRYING: MORE THAN HALF THE DAYS
GAD7 TOTAL SCORE: 13
3. WORRYING TOO MUCH ABOUT DIFFERENT THINGS: SEVERAL DAYS
GAD7 TOTAL SCORE: 5
2. NOT BEING ABLE TO STOP OR CONTROL WORRYING: SEVERAL DAYS
IF YOU CHECKED OFF ANY PROBLEMS ON THIS QUESTIONNAIRE, HOW DIFFICULT HAVE THESE PROBLEMS MADE IT FOR YOU TO DO YOUR WORK, TAKE CARE OF THINGS AT HOME, OR GET ALONG WITH OTHER PEOPLE: VERY DIFFICULT
5. BEING SO RESTLESS THAT IT IS HARD TO SIT STILL: SEVERAL DAYS
7. FEELING AFRAID AS IF SOMETHING AWFUL MIGHT HAPPEN: NOT AT ALL
4. TROUBLE RELAXING: NOT AT ALL
2. NOT BEING ABLE TO STOP OR CONTROL WORRYING: SEVERAL DAYS
6. BECOMING EASILY ANNOYED OR IRRITABLE: SEVERAL DAYS
3. WORRYING TOO MUCH ABOUT DIFFERENT THINGS: SEVERAL DAYS
GAD7 TOTAL SCORE: 6
7. FEELING AFRAID AS IF SOMETHING AWFUL MIGHT HAPPEN: SEVERAL DAYS
1. FEELING NERVOUS, ANXIOUS, OR ON EDGE: NEARLY EVERY DAY
IF YOU CHECKED OFF ANY PROBLEMS ON THIS QUESTIONNAIRE, HOW DIFFICULT HAVE THESE PROBLEMS MADE IT FOR YOU TO DO YOUR WORK, TAKE CARE OF THINGS AT HOME, OR GET ALONG WITH OTHER PEOPLE: SOMEWHAT DIFFICULT
GAD7 TOTAL SCORE: 5
GAD7 TOTAL SCORE: 13
7. FEELING AFRAID AS IF SOMETHING AWFUL MIGHT HAPPEN: NOT AT ALL
6. BECOMING EASILY ANNOYED OR IRRITABLE: NOT AT ALL
3. WORRYING TOO MUCH ABOUT DIFFERENT THINGS: NOT AT ALL
5. BEING SO RESTLESS THAT IT IS HARD TO SIT STILL: NOT AT ALL
4. TROUBLE RELAXING: NEARLY EVERY DAY
1. FEELING NERVOUS, ANXIOUS, OR ON EDGE: NEARLY EVERY DAY
1. FEELING NERVOUS, ANXIOUS, OR ON EDGE: NEARLY EVERY DAY
5. BEING SO RESTLESS THAT IT IS HARD TO SIT STILL: MORE THAN HALF THE DAYS
6. BECOMING EASILY ANNOYED OR IRRITABLE: SEVERAL DAYS

## 2019-01-01 ASSESSMENT — PAIN SCALES - GENERAL
PAINLEVEL: NO PAIN (0)

## 2019-01-01 ASSESSMENT — PATIENT HEALTH QUESTIONNAIRE - PHQ9
SUM OF ALL RESPONSES TO PHQ QUESTIONS 1-9: 2
SUM OF ALL RESPONSES TO PHQ QUESTIONS 1-9: 17
SUM OF ALL RESPONSES TO PHQ QUESTIONS 1-9: 3
5. POOR APPETITE OR OVEREATING: NOT AT ALL

## 2019-01-01 ASSESSMENT — MIFFLIN-ST. JEOR
SCORE: 1178.85
SCORE: 1165.71

## 2019-04-11 NOTE — TELEPHONE ENCOUNTER
4:12 PM    Reason for Call: Phone Call    Description: pt would like a call back about medications due to appt missed today.    Was an appointment offered for this call? Yes  If yes : Appointment type: med review              Date 04/15/19    Preferred method for responding to this message: Telephone Call  What is your phone number ? [phone]    If we cannot reach you directly, may we leave a detailed response at the number you provided? Yes    Can this message wait until your PCP/provider returns, if available today? Not applicable, Provider is in today.    Kimberly Whitaker

## 2019-04-15 NOTE — TELEPHONE ENCOUNTER
Klonopin - Patient has appointment today but is requesting short supply so that she can make it to appointment due to high anxiety.    Last visit: 8.28.18  Last refill: 2.25.19 #30    Phone: [phone]    Next 5 appointments (look out 90 days)    Apr 15, 2019 10:00 AM CDT  (Arrive by 9:45 AM)  [facility] [address]  [facility] [place] [address]  [phone]

## 2019-04-16 NOTE — PROGRESS NOTES
"  SUBJECTIVE:   Cherelle Tom is a 53 year old female who presents to clinic today for the following   health issues:      Anxiety Follow-Up    Status since last visit: Worsened \"Horrible\"    Other associated symptoms:None    Complicating factors:   Significant life event: No   Current substance abuse: None  Depression symptoms: Yes-  And panic attacks  EMILE-7 SCORE 3/6/2018 2018 2019   Total Score 4 4 6       EMILE-7    Amount of exercise or physical activity: 4-5 days/week for an average of 15-30 minutes    Problems taking medications regularly: Yes,  cost of medication    Medication side effects: none    Diet: regular (no restrictions)        Next patient stopped her Amlodipine several months ago and has not had f/u. Denies chest pain, palpitations,SOB    Additional history: as documented    Reviewed  and updated as needed this visit by clinical staff  Tobacco  Allergies  Meds  Med Hx  Surg Hx  Fam Hx  Soc Hx        Reviewed and updated as needed this visit by Provider         Patient Active Problem List   Diagnosis     Anxiety state     Depression     Suicide attempt (H)     Suicide (H)     Transient cerebral ischemia     HTN (hypertension)     Suicidal behavior     Alcohol dependence (H)     Advanced care planning/counseling discussion     Alcohol abuse     Suicidal ideation     Past Surgical History:   Procedure Laterality Date       1991    hysterectomy       1991     HYSTERECTOMY, CHICHO  2011    Dr Jennings       Social History     Tobacco Use     Smoking status: Current Every Day Smoker     Packs/day: 0.50     Types: Cigarettes     Smokeless tobacco: Never Used     Tobacco comment: Denied Quit Plan   Substance Use Topics     Alcohol use: Yes     Alcohol/week: 0.0 oz     Types: 2 - 3 Cans of beer per week     Comment: 4 beers weekly, lately couple every day     Family History   Problem Relation Age of Onset     Cancer Mother         Lung     Hypertension Mother      " "C.A.PAIGE. Father 79     Cerebrovascular Disease Father         CVA     Heart Disease Father         Disease     Cancer Sister 52        Lymphona, cause of death         Current Outpatient Medications   Medication Sig Dispense Refill     amLODIPine (NORVASC) 5 MG tablet Take 1 tablet (5 mg) by mouth daily 30 tablet 1     citalopram (CELEXA) 20 MG tablet Take 1 tablet (20 mg) by mouth daily 30 tablet 1     clonazePAM (KLONOPIN) 0.5 MG tablet TAKE 1 TABLET BY MOUTH 2TIMES DAILY AS NEEDED FOR ANXIETY 60 tablet 0     No Known Allergies    ROS:  Constitutional, HEENT, cardiovascular, pulmonary, gi and gu systems are negative, except as otherwise noted.    OBJECTIVE:                                                    /80 (BP Location: Right arm, Patient Position: Sitting, Cuff Size: Adult Regular)   Pulse 91   Temp 99.1  F (37.3  C) (Tympanic)   Ht 1.619 m (5' 3.75\")   Wt 59 kg (130 lb)   SpO2 96%   BMI 22.49 kg/m    Body mass index is 22.49 kg/m .    Physical Exam:  Constitutional: healthy, alert and no distress  CV: RRR. No murmur. No pretibial edema  Pulm: Lungs clear to auscultation without wheeze, rales or rhonchi.  Skin: no suspicious lesions or rashes  Psych: Mood is predominately euthymic with corresponding affect. Normal rate, tone and volume of speech. Goal directed thought process. Normal thought content. Patient shows good insight and judgement. Denies homicidal or suicidal ideation, intent or plan.             ASSESSMENT/PLAN:                                                    (F41.1) EMILE (generalized anxiety disorder)  (primary encounter diagnosis)  Comment: Start Citalopram. May take Klonopin bid prn for now. 3 week f/u  Plan: clonazePAM (KLONOPIN) 0.5 MG tablet, citalopram        (CELEXA) 20 MG tablet            (Z72.0) Tobacco abuse  Plan: Tobacco Cessation - Order to Satisfy Health         Maintenance            (Z71.6) Tobacco abuse counseling  Comment: Discussion with patient regarding the " risks associated with tobacco including dependency and health related illnesses including lung cancer.The health benefits of stopping tobacco abuse are discussed. Discussed options available to help patient stop tobacco use including medications and support network. Patient shows good understanding        (I10) Benign essential hypertension  Comment: Restart med. 3 week f/u  Plan: amLODIPine (NORVASC) 5 MG tablet            (F34.1) Dysthymia  Comment: as above  Plan: citalopram (CELEXA) 20 MG tablet                    BHAVIN Joshi  [facility]

## 2019-04-16 NOTE — PATIENT INSTRUCTIONS

## 2019-04-16 NOTE — NURSING NOTE
"Chief Complaint   Patient presents with     Anxiety       Initial /80 (BP Location: Right arm, Patient Position: Sitting, Cuff Size: Adult Regular)   Pulse 91   Temp 99.1  F (37.3  C) (Tympanic)   Ht 1.619 m (5' 3.75\")   Wt 59 kg (130 lb)   SpO2 96%   BMI 22.49 kg/m   Estimated body mass index is 22.49 kg/m  as calculated from the following:    Height as of this encounter: 1.619 m (5' 3.75\").    Weight as of this encounter: 59 kg (130 lb).  Medication Reconciliation: complete     Note patient aware of elevated BP as not taking medication previously prescribed d/t cost.    Winnie Eastman LPN  "

## 2019-06-10 NOTE — TELEPHONE ENCOUNTER
Patient states she is leaving early in the AM on a plane to [place] son was in an accident.         amLODIPine (NORVASC) 5 MG tablet    Last Written Prescription Date:  04/16/2019  Last Fill Quantity: 30,   # refills: 1  Last Office Visit: 04/16/2019     citalopram (CELEXA) 20 MG tablet     Last Written Prescription Date:  04/16/2019  Last Fill Quantity: 30,   # refills: 1        clonazePAM (KLONOPIN) 0.5 MG tablet  Last Written Prescription Date:  04/16/2019  Last Fill Quantity: 60,   # refills: 0  Last Office Visit: 04/16/2019  Future Office visit:       Routing refill request to provider for review/approval because:

## 2019-06-11 NOTE — TELEPHONE ENCOUNTER
3:57 PM    Reason for Call: Phone Call    Description: Patient would like PCP nurse to give her a phone call back in regards to prescriptions listed below. Patient would like to only discuss with pcp nurse., notified pcp is out of office and stated message it okay to wait until pcp returns.     Was an appointment offered for this call? No  If yes : Appointment type              Date    Preferred method for responding to this message: Telephone Call  What is your phone number ?[phone]    If we cannot reach you directly, may we leave a detailed response at the number you provided? Yes    Can this message wait until your PCP/provider returns, if available today? YES    Karlie Chen MA

## 2019-06-12 NOTE — TELEPHONE ENCOUNTER
"Patient called stating the medication she was started on is not working. OV 4/16/2019 \"Comment: Start Citalopram. May take Klonopin bid prn for now. 3 week f/u\"  Patient explained she is a nervous wreck and on the verge of a panic attack all the time. Patient states it is hard for her to work. Wondering if she needs an increase in dosage or a different medication? Message sent to BHAVIN Maciel for review. Please advise.      Berenice Moon LPN on 6/12/2019 at 2:31 PM  "

## 2019-06-12 NOTE — TELEPHONE ENCOUNTER
Left message on machine that patient needs an appointment. Call back number provided to call and schedule.     Berenice Moon LPN on 6/12/2019 at 3:15 PM

## 2019-06-17 NOTE — PROGRESS NOTES
"  SUBJECTIVE:   Cherelle Tom is a 54 year old female who presents to clinic today for the following   health issues: Pt has been on Citalopram 20 mg daily for 2-3 months. Worsening anxiety and panic attacks daily.    Hypertension Follow-up      Do you check your blood pressure regularly outside of the clinic? No     Are you following a low salt diet? Yes    Are your blood pressures ever more than 140 on the top number (systolic) OR more   than 90 on the bottom number (diastolic), for example 140/90? No checking  Depression and Anxiety Follow-Up    How are you doing with your depression since your last visit? No change    How are you doing with your anxiety since your last visit?  Worsened has been out of klonopin for over a month. celexa is making her anxiety worse.    Are you having other symptoms that might be associated with depression or anxiety? Yes:  not sleeping, heart is racing.    Have you had a significant life event? No     Do you have any concerns with your use of alcohol or other drugs? No    Social History     Tobacco Use     Smoking status: Current Every Day Smoker     Packs/day: 0.50     Types: Cigarettes     Smokeless tobacco: Never Used     Tobacco comment: Denied Quit Plan   Substance Use Topics     Alcohol use: Yes     Alcohol/week: 0.0 oz     Types: 2 - 3 Cans of beer per week     Comment: 4 beers weekly, lately couple every day     Drug use: No     PHQ 3/6/2018 8/28/2018 4/16/2019   PHQ-9 Total Score 7 5 3   Q9: Thoughts of better off dead/self-harm past 2 weeks Not at all Not at all Not at all     EMILE-7 SCORE 3/6/2018 8/28/2018 4/16/2019   Total Score 4 4 6           Suicide Assessment Five-step Evaluation and Treatment (SAFE-T)    Amount of exercise or physical activity: None    Problems taking medications regularly: No    Medication side effects: racing heart and patient states \"feels like her inside are going crazy\"    Diet: not much of an appetite.            Additional history: as " documented    Reviewed  and updated as needed this visit by clinical staff         Reviewed and updated as needed this visit by Provider         Patient Active Problem List   Diagnosis     Anxiety state     Depression     Suicide attempt (H)     Suicide (H)     Transient cerebral ischemia     HTN (hypertension)     Suicidal behavior     Alcohol dependence (H)     Advanced care planning/counseling discussion     Alcohol abuse     Suicidal ideation     Past Surgical History:   Procedure Laterality Date       1991    hysterectomy       1991     HYSTERECTOMY, CHICHO  2011    Dr Jennings       Social History     Tobacco Use     Smoking status: Current Every Day Smoker     Packs/day: 0.50     Years: 37.00     Pack years: 18.50     Types: Cigarettes     Start date: 1981     Smokeless tobacco: Never Used     Tobacco comment: Denied Quit Plan   Substance Use Topics     Alcohol use: Yes     Alcohol/week: 0.0 oz     Types: 2 - 3 Cans of beer per week     Comment: 4 beers weekly, lately couple every day     Family History   Problem Relation Age of Onset     Cancer Mother         Lung     Hypertension Mother      C.A.D. Father 79     Cerebrovascular Disease Father         CVA     Heart Disease Father         Disease     Cancer Sister 52        Lymphona, cause of death         Current Outpatient Medications   Medication Sig Dispense Refill     amLODIPine (NORVASC) 5 MG tablet TAKE 1 TABLET BY MOUTH ONCE DAILY. 30 tablet 1     citalopram (CELEXA) 20 MG tablet TAKE 1 TABLET BY MOUTH ONCE DAILY. 30 tablet 1     clonazePAM (KLONOPIN) 0.5 MG tablet 1 tab tid 90 tablet 1     No Known Allergies    ROS:  Constitutional, HEENT, cardiovascular, pulmonary, gi and gu systems are negative, except as otherwise noted.    OBJECTIVE:                                                    /78   Pulse 90   Wt 59 kg (130 lb)   SpO2 98%   BMI 22.49 kg/m    Body mass index is 22.49 kg/m .  GENERAL APPEARANCE: healthy,  alert and no distress  SKIN: no suspicious lesions or rashes  Psych: Mood is predominately euthymic with corresponding affect. Normal rate, tone and volume of speech. Goal directed thought process. Normal thought content. Patient shows good insight and judgement. Denies homicidal or suicidal ideation, intent or plan.  PHQ-9 score 17. EMILE score 13.         ASSESSMENT/PLAN:                                                    1. EMILE (generalized anxiety disorder)  Taper Citalopram by taking 10 mg daily for 4 days, then stop. Restart Klonopin as she did better with this medication in the past. 1 month f/u  - clonazePAM (KLONOPIN) 0.5 MG tablet; 1 tab tid  Dispense: 90 tablet; Refill: 1      15 minutes spent with patient with greater than 50% of time spent in counseling and coordination of cares. We discussed risks and benefits of medication and expected course for improvement with this medication. Patient shows good understanding.      BHAVIN Joshi  [facility]

## 2019-06-19 NOTE — NURSING NOTE
"Chief Complaint   Patient presents with     Depression     Anxiety     Hypertension       Initial /78   Pulse 90   Wt 59 kg (130 lb)   SpO2 98%   BMI 22.49 kg/m   Estimated body mass index is 22.49 kg/m  as calculated from the following:    Height as of 4/16/19: 1.619 m (5' 3.75\").    Weight as of this encounter: 59 kg (130 lb).  Medication Reconciliation: complete        "

## 2019-06-19 NOTE — LETTER
My Depression Action Plan  Name: Cherelle Tom   Date of Birth 1965  Date: 6/17/2019    My doctor: Yue Foote   My clinic: [address] [place] [facility] [address]  [phone]          GREEN    ZONE   Good Control    What it looks like:     Things are going generally well. You have normal up s and down s. You may even feel depressed from time to time, but bad moods usually last less than a day.   What you need to do:  1. Continue to care for yourself (see self care plan)  2. Check your depression survival kit and update it as needed  3. Follow your physician s recommendations including any medication.  4. Do not stop taking medication unless you consult with your physician first.           YELLOW         ZONE Getting Worse    What it looks like:     Depression is starting to interfere with your life.     It may be hard to get out of bed; you may be starting to isolate yourself from others.    Symptoms of depression are starting to last most all day and this has happened for several days.     You may have suicidal thoughts but they are not constant.   What you need to do:     1. Call your care team, your response to treatment will improve if you keep your care team informed of your progress. Yellow periods are signs an adjustment may need to be made.     2. Continue your self-care, even if you have to fake it!    3. Talk to someone in your support network    4. Open up your depression survival kit           RED    ZONE Medical Alert - Get Help    What it looks like:     Depression is seriously interfering with your life.     You may experience these or other symptoms: You can t get out of bed most days, can t work or engage in other necessary activities, you have trouble taking care of basic hygiene, or basic responsibilities, thoughts of suicide or death that will not go away, self-injurious behavior.     What you need to do:  1. Call your care team and  request a same-day appointment. If they are not available (weekends or after hours) call your local crisis line, emergency room or 911.            Depression Self Care Plan / Survival Kit    Self-Care for Depression  Here s the deal. Your body and mind are really not as separate as most people think.  What you do and think affects how you feel and how you feel influences what you do and think. This means if you do things that people who feel good do, it will help you feel better.  Sometimes this is all it takes.  There is also a place for medication and therapy depending on how severe your depression is, so be sure to consult with your medical provider and/ or Behavioral Health Consultant if your symptoms are worsening or not improving.     In order to better manage my stress, I will:    Exercise  Get some form of exercise, every day. This will help reduce pain and release endorphins, the  feel good  chemicals in your brain. This is almost as good as taking antidepressants!  This is not the same as joining a gym and then never going! (they count on that by the way ) It can be as simple as just going for a walk or doing some gardening, anything that will get you moving.      Hygiene   Maintain good hygiene (Get out of bed in the morning, Make your bed, Brush your teeth, Take a shower, and Get dressed like you were going to work, even if you are unemployed).  If your clothes don't fit try to get ones that do.    Diet  I will strive to eat foods that are good for me, drink plenty of water, and avoid excessive sugar, caffeine, alcohol, and other mood-altering substances.  Some foods that are helpful in depression are: complex carbohydrates, B vitamins, flaxseed, fish or fish oil, fresh fruits and vegetables.    Psychotherapy  I agree to participate in Individual Therapy (if recommended).    Medication  If prescribed medications, I agree to take them.  Missing doses can result in serious side effects.  I understand that  drinking alcohol, or other illicit drug use, may cause potential side effects.  I will not stop my medication abruptly without first discussing it with my provider.    Staying Connected With Others  I will stay in touch with my friends, family members, and my primary care provider/team.    Use your imagination  Be creative.  We all have a creative side; it doesn t matter if it s oil painting, sand castles, or mud pies! This will also kick up the endorphins.    Witness Beauty  (AKA stop and smell the roses) Take a look outside, even in mid-winter. Notice colors, textures. Watch the squirrels and birds.     Service to others  Be of service to others.  There is always someone else in need.  By helping others we can  get out of ourselves  and remember the really important things.  This also provides opportunities for practicing all the other parts of the program.    Humor  Laugh and be silly!  Adjust your TV habits for less news and crime-drama and more comedy.    Control your stress  Try breathing deep, massage therapy, biofeedback, and meditation. Find time to relax each day.     My support system    Clinic Contact:  Phone number:    Contact 1:  Phone number:    Contact 2:  Phone number:    Zoroastrianism/:  Phone number:    Therapist:  Phone number:    Local crisis center:    Phone number:    Other community support:  Phone number:

## 2019-10-02 NOTE — TELEPHONE ENCOUNTER
clonazePAM (KLONOPIN) 0.5 MG tablet      Last Written Prescription Date:  6-19-19  Last Fill Quantity: 90,   # refills: 1  Last Office Visit: 6-19-19  Future Office visit:       Routing refill request to provider for review/approval because:  Drug not on the [facility], [facility] or [facility] refill protocol or controlled substance

## 2019-12-16 NOTE — PROGRESS NOTES
"Subjective     Cherelle Tom is a 54 year old female who presents to clinic today for the following health issues:    HPI   Anxiety Follow-Up    How are you doing with your anxiety since your last visit? { :771503::\"No change\"}    Are you having other symptoms that might be associated with anxiety? { :160171}    Have you had a significant life event? { :946184}     Are you feeling depressed? { :450472}    Do you have any concerns with your use of alcohol or other drugs? { :614991}    Social History     Tobacco Use     Smoking status: Current Every Day Smoker     Packs/day: 0.50     Years: 37.00     Pack years: 18.50     Types: Cigarettes     Start date: 1/1/1981     Smokeless tobacco: Never Used     Tobacco comment: Denied Quit Plan   Substance Use Topics     Alcohol use: Yes     Alcohol/week: 0.0 standard drinks     Types: 2 - 3 Cans of beer per week     Comment: 4 beers weekly, lately couple every day     Drug use: No     EMILE-7 SCORE 8/28/2018 4/16/2019 6/19/2019   Total Score 4 6 13     PHQ 8/28/2018 4/16/2019 6/19/2019   PHQ-9 Total Score 5 3 17   Q9: Thoughts of better off dead/self-harm past 2 weeks Not at all Not at all Not at all     {Last PHQ9 or GAD7 Responses (Optional):848161}      How many servings of fruits and vegetables do you eat daily?  { :926746}    On average, how many sweetened beverages do you drink each day (Examples: soda, juice, sweet tea, etc.  Do NOT count diet or artificially sweetened beverages)?   { 1-11:094449}    How many days per week do you miss taking your medication? {0-7 :904177}    {additonal problems for provider to add (Optional):412549}    {HIST REVIEW/ LINKS 2 (Optional):642668}    {Additional problems for the provider to add (optional):055488}  Reviewed and updated as needed this visit by Provider         Review of Systems   {ROS COMP (Optional):141729}      Objective    There were no vitals taken for this visit.  There is no height or weight on file to calculate " "BMI.  Physical Exam   {Exam List (Optional):076055}    {Diagnostic Test Results (Optional):772001::\"Diagnostic Test Results:\",\"Labs reviewed in Epic\"}        {PROVIDER CHARTING PREFERENCE:390933}      "

## 2019-12-17 NOTE — PROGRESS NOTES
Subjective     Cherelle Tom is a 54 year old female who presents to clinic today for the following health issues: Out of meds for a few weeks.    HPI   Hypertension Follow-up      Do you check your blood pressure regularly outside of the clinic? No     Are you following a low salt diet? Yes    Are your blood pressures ever more than 140 on the top number (systolic) OR more   than 90 on the bottom number (diastolic), for example 140/90? Yes    Anxiety Follow-Up    How are you doing with your anxiety since your last visit? Worsened     Are you having other symptoms that might be associated with anxiety? Yes:  overthinking, insomnia, anxious, cant sit still    Have you had a significant life event? No     Are you feeling depressed? Yes:  famly issues    Do you have any concerns with your use of alcohol or other drugs? No    Social History     Tobacco Use     Smoking status: Current Every Day Smoker     Packs/day: 0.50     Years: 37.00     Pack years: 18.50     Types: Cigarettes     Start date: 1/1/1981     Smokeless tobacco: Never Used     Tobacco comment: Denied Quit Plan   Substance Use Topics     Alcohol use: Yes     Alcohol/week: 0.0 standard drinks     Types: 2 - 3 Cans of beer per week     Comment: 4 beers weekly, lately couple every day     Drug use: No     EMILE-7 SCORE 4/16/2019 6/19/2019 12/17/2019   Total Score 6 13 5     PHQ 4/16/2019 6/19/2019 12/17/2019   PHQ-9 Total Score 3 17 2   Q9: Thoughts of better off dead/self-harm past 2 weeks Not at all Not at all Not at all     Last PHQ-9 12/17/2019   1.  Little interest or pleasure in doing things 0   2.  Feeling down, depressed, or hopeless 2   3.  Trouble falling or staying asleep, or sleeping too much 0   4.  Feeling tired or having little energy 0   5.  Poor appetite or overeating 0   6.  Feeling bad about yourself 0   7.  Trouble concentrating 0   8.  Moving slowly or restless 0   Q9: Thoughts of better off dead/self-harm past 2 weeks 0   PHQ-9 Total  Score 2   Difficulty at work, home, or with people -     EMILE-7  2019   1. Feeling nervous, anxious, or on edge 3   2. Not being able to stop or control worrying 1   3. Worrying too much about different things 1   4. Trouble relaxing 0   5. Being so restless that it is hard to sit still 0   6. Becoming easily annoyed or irritable 0   7. Feeling afraid, as if something awful might happen 0   EMILE-7 Total Score 5   If you checked any problems, how difficult have they made it for you to do your work, take care of things at home, or get along with other people? -         How many servings of fruits and vegetables do you eat daily?  0-1    On average, how many sweetened beverages do you drink each day (Examples: soda, juice, sweet tea, etc.  Do NOT count diet or artificially sweetened beverages)?   0    How many days per week do you miss taking your medication? 0        Patient Active Problem List   Diagnosis     Anxiety state     Depression     Suicide attempt (H)     Suicide (H)     Transient cerebral ischemia     HTN (hypertension)     Suicidal behavior     Alcohol dependence (H)     Advanced care planning/counseling discussion     Alcohol abuse     Suicidal ideation     Past Surgical History:   Procedure Laterality Date       1991    hysterectomy       1991     HYSTERECTOMY, CHICHO  2011    Dr Jennings       Social History     Tobacco Use     Smoking status: Current Every Day Smoker     Packs/day: 0.50     Years: 37.00     Pack years: 18.50     Types: Cigarettes     Start date: 1981     Smokeless tobacco: Never Used     Tobacco comment: Denied Quit Plan   Substance Use Topics     Alcohol use: Yes     Alcohol/week: 0.0 standard drinks     Types: 2 - 3 Cans of beer per week     Comment: 4 beers weekly, lately couple every day     Family History   Problem Relation Age of Onset     Cancer Mother         Lung     Hypertension Mother      C.A.D. Father 79     Cerebrovascular Disease Father        "  CVA     Heart Disease Father         Disease     Cancer Sister 52        [place], cause of death         Current Outpatient Medications   Medication Sig Dispense Refill     amLODIPine (NORVASC) 5 MG tablet Take 1 tablet (5 mg) by mouth daily 90 tablet 3     clonazePAM (KLONOPIN) 0.5 MG tablet TAKE (1) TABLET BY MOUTH 3 TIMES DAILY 90 tablet 5     No Known Allergies      Reviewed and updated as needed this visit by Provider         Review of Systems   ROS COMP: Constitutional, HEENT, cardiovascular, pulmonary, gi and gu systems are negative, except as otherwise noted.      Objective    BP (!) 170/90 (Patient Position: Sitting)   Pulse 84   Ht 1.619 m (5' 3.75\")   Wt 60.8 kg (134 lb)   SpO2 (!) 9%   BMI 23.18 kg/m    Body mass index is 23.18 kg/m .  Physical Exam     Physical Exam:  Constitutional: healthy, alert and no distress  CV: RRR. No murmur. No pretibial edema  Pulm: Lungs clear to auscultation without wheeze, rales or rhonchi.  Skin: no suspicious lesions or rashes  Psych: mood is euthymic with corresponding affect                Assessment & Plan     (I10) Benign essential hypertension  Comment: BP up but out of med. RF  Plan: amLODIPine (NORVASC) 5 MG tablet            (F41.1) EMILE (generalized anxiety disorder)  Comment: stable  Plan: clonazePAM (KLONOPIN) 0.5 MG tablet                 Tobacco Cessation:   reports that she has been smoking cigarettes. She started smoking about 38 years ago. She has a 18.50 pack-year smoking history. She has never used smokeless tobacco.          6 month f/u    No follow-ups on file.    BHAVIN Joshi  [facility]      "

## 2019-12-17 NOTE — NURSING NOTE
"Chief Complaint   Patient presents with     Hypertension     Anxiety       Initial BP (!) 170/90 (Patient Position: Sitting)   Pulse 84   Ht 1.619 m (5' 3.75\")   Wt 60.8 kg (134 lb)   SpO2 (!) 9%   BMI 23.18 kg/m   Estimated body mass index is 23.18 kg/m  as calculated from the following:    Height as of this encounter: 1.619 m (5' 3.75\").    Weight as of this encounter: 60.8 kg (134 lb).  Medication Reconciliation: complete  Anca Scott LPN  "

## 2023-01-06 NOTE — TELEPHONE ENCOUNTER
cyclobenzaprine (FLEXERIL) 10 MG tablet    Last Written Prescription Date:  3/6/18  Last Fill Quantity: 14,   # refills: 0  Last Office Visit: 3/6/18  Future Office visit:       klonopin      Last Written Prescription Date:  1/17/18  Last Fill Quantity: 90,   # refills: 0  Last Office Visit: 3/6/18  Future Office visit:       Routing refill request to provider for review/approval because:  Drug not on the [facility], [facility] or [facility] refill protocol or controlled substance    tramadol      Last Written Prescription Date:  3/6/18  Last Fill Quantity: 20,   # refills: 0  Last Office Visit: 3/6/18  Future Office visit:       Routing refill request to provider for review/approval because:  Drug not on the [facility], [facility] or [facility] refill protocol or controlled substance    
75